# Patient Record
Sex: MALE | Race: WHITE
[De-identification: names, ages, dates, MRNs, and addresses within clinical notes are randomized per-mention and may not be internally consistent; named-entity substitution may affect disease eponyms.]

---

## 2019-10-15 ENCOUNTER — HOSPITAL ENCOUNTER (INPATIENT)
Dept: HOSPITAL 61 - ER | Age: 84
LOS: 8 days | Discharge: NURSING, REHAB OR LONG-TERM CARE | DRG: 291 | End: 2019-10-23
Attending: FAMILY MEDICINE | Admitting: FAMILY MEDICINE
Payer: MEDICARE

## 2019-10-15 VITALS — WEIGHT: 185 LBS | BODY MASS INDEX: 28.04 KG/M2 | HEIGHT: 68 IN

## 2019-10-15 VITALS — DIASTOLIC BLOOD PRESSURE: 53 MMHG | SYSTOLIC BLOOD PRESSURE: 105 MMHG

## 2019-10-15 VITALS — DIASTOLIC BLOOD PRESSURE: 56 MMHG | SYSTOLIC BLOOD PRESSURE: 121 MMHG

## 2019-10-15 VITALS — DIASTOLIC BLOOD PRESSURE: 57 MMHG | SYSTOLIC BLOOD PRESSURE: 114 MMHG

## 2019-10-15 VITALS — DIASTOLIC BLOOD PRESSURE: 55 MMHG | SYSTOLIC BLOOD PRESSURE: 151 MMHG

## 2019-10-15 DIAGNOSIS — H91.90: ICD-10-CM

## 2019-10-15 DIAGNOSIS — J44.1: ICD-10-CM

## 2019-10-15 DIAGNOSIS — I50.33: ICD-10-CM

## 2019-10-15 DIAGNOSIS — I73.9: ICD-10-CM

## 2019-10-15 DIAGNOSIS — R04.2: ICD-10-CM

## 2019-10-15 DIAGNOSIS — J44.0: ICD-10-CM

## 2019-10-15 DIAGNOSIS — I25.10: ICD-10-CM

## 2019-10-15 DIAGNOSIS — I08.0: ICD-10-CM

## 2019-10-15 DIAGNOSIS — M19.90: ICD-10-CM

## 2019-10-15 DIAGNOSIS — Z79.899: ICD-10-CM

## 2019-10-15 DIAGNOSIS — Z86.73: ICD-10-CM

## 2019-10-15 DIAGNOSIS — E87.5: ICD-10-CM

## 2019-10-15 DIAGNOSIS — I87.8: ICD-10-CM

## 2019-10-15 DIAGNOSIS — J96.01: ICD-10-CM

## 2019-10-15 DIAGNOSIS — I27.20: ICD-10-CM

## 2019-10-15 DIAGNOSIS — Z79.82: ICD-10-CM

## 2019-10-15 DIAGNOSIS — N18.3: ICD-10-CM

## 2019-10-15 DIAGNOSIS — Z95.2: ICD-10-CM

## 2019-10-15 DIAGNOSIS — E78.5: ICD-10-CM

## 2019-10-15 DIAGNOSIS — I13.0: Primary | ICD-10-CM

## 2019-10-15 DIAGNOSIS — J20.9: ICD-10-CM

## 2019-10-15 DIAGNOSIS — F03.90: ICD-10-CM

## 2019-10-15 DIAGNOSIS — Z85.828: ICD-10-CM

## 2019-10-15 DIAGNOSIS — N17.0: ICD-10-CM

## 2019-10-15 LAB
ALBUMIN SERPL-MCNC: 3.6 G/DL (ref 3.4–5)
ALBUMIN/GLOB SERPL: 1.1 {RATIO} (ref 1–1.7)
ALP SERPL-CCNC: 76 U/L (ref 46–116)
ALT SERPL-CCNC: 25 U/L (ref 16–63)
ANION GAP SERPL CALC-SCNC: 11 MMOL/L (ref 6–14)
APTT PPP: YELLOW S
AST SERPL-CCNC: 25 U/L (ref 15–37)
BACTERIA #/AREA URNS HPF: 0 /HPF
BASE EXCESS ABG: -2 MMOL/L (ref -3–3)
BASOPHILS # BLD AUTO: 0 X10^3/UL (ref 0–0.2)
BASOPHILS NFR BLD: 1 % (ref 0–3)
BILIRUB SERPL-MCNC: 2 MG/DL (ref 0.2–1)
BILIRUB UR QL STRIP: NEGATIVE
BUN SERPL-MCNC: 66 MG/DL (ref 8–26)
BUN/CREAT SERPL: 35 (ref 6–20)
CALCIUM SERPL-MCNC: 9.6 MG/DL (ref 8.5–10.1)
CHLORIDE SERPL-SCNC: 101 MMOL/L (ref 98–107)
CK SERPL-CCNC: 154 U/L (ref 39–308)
CO2 SERPL-SCNC: 30 MMOL/L (ref 21–32)
CREAT SERPL-MCNC: 1.9 MG/DL (ref 0.7–1.3)
EOSINOPHIL NFR BLD: 0.1 X10^3/UL (ref 0–0.7)
EOSINOPHIL NFR BLD: 2 % (ref 0–3)
ERYTHROCYTE [DISTWIDTH] IN BLOOD BY AUTOMATED COUNT: 15.4 % (ref 11.5–14.5)
FIBRINOGEN PPP-MCNC: CLEAR MG/DL
GFR SERPLBLD BASED ON 1.73 SQ M-ARVRAT: 33.8 ML/MIN
GLOBULIN SER-MCNC: 3.2 G/DL (ref 2.2–3.8)
GLUCOSE SERPL-MCNC: 96 MG/DL (ref 70–99)
HCO3 BLDA-SCNC: 23 MMOL/L (ref 21–28)
HCT VFR BLD CALC: 39.1 % (ref 39–53)
HGB BLD-MCNC: 12.9 G/DL (ref 13–17.5)
HYALINE CASTS #/AREA URNS LPF: (no result) /HPF
INSPIRATION SETTING TIME VENT: 28
LYMPHOCYTES # BLD: 0.8 X10^3/UL (ref 1–4.8)
LYMPHOCYTES NFR BLD AUTO: 10 % (ref 24–48)
MCH RBC QN AUTO: 29 PG (ref 25–35)
MCHC RBC AUTO-ENTMCNC: 33 G/DL (ref 31–37)
MCV RBC AUTO: 88 FL (ref 79–100)
MONO #: 0.9 X10^3/UL (ref 0–1.1)
MONOCYTES NFR BLD: 12 % (ref 0–9)
NEUT #: 6 X10^3/UL (ref 1.8–7.7)
NEUTROPHILS NFR BLD AUTO: 76 % (ref 31–73)
NITRITE UR QL STRIP: NEGATIVE
PCO2 BLDA: 39 MMHG (ref 35–46)
PH UR STRIP: 5 [PH]
PLATELET # BLD AUTO: 98 X10^3/UL (ref 140–400)
PO2 BLDA: 80 MMHG (ref 65–108)
POTASSIUM SERPL-SCNC: 4.2 MMOL/L (ref 3.5–5.1)
PROT SERPL-MCNC: 6.8 G/DL (ref 6.4–8.2)
PROT UR STRIP-MCNC: NEGATIVE MG/DL
RBC # BLD AUTO: 4.46 X10^6/UL (ref 4.3–5.7)
RBC #/AREA URNS HPF: 0 /HPF (ref 0–2)
SAO2 % BLDA: 94 % (ref 92–99)
SODIUM SERPL-SCNC: 142 MMOL/L (ref 136–145)
SQUAMOUS #/AREA URNS LPF: (no result) /LPF
UROBILINOGEN UR-MCNC: 0.2 MG/DL
WBC # BLD AUTO: 7.9 X10^3/UL (ref 4–11)
WBC #/AREA URNS HPF: (no result) /HPF (ref 0–4)

## 2019-10-15 PROCEDURE — 80053 COMPREHEN METABOLIC PANEL: CPT

## 2019-10-15 PROCEDURE — 80048 BASIC METABOLIC PNL TOTAL CA: CPT

## 2019-10-15 PROCEDURE — 71045 X-RAY EXAM CHEST 1 VIEW: CPT

## 2019-10-15 PROCEDURE — 93970 EXTREMITY STUDY: CPT

## 2019-10-15 PROCEDURE — 96374 THER/PROPH/DIAG INJ IV PUSH: CPT

## 2019-10-15 PROCEDURE — 85025 COMPLETE CBC W/AUTO DIFF WBC: CPT

## 2019-10-15 PROCEDURE — 71250 CT THORAX DX C-: CPT

## 2019-10-15 PROCEDURE — G0378 HOSPITAL OBSERVATION PER HR: HCPCS

## 2019-10-15 PROCEDURE — 93005 ELECTROCARDIOGRAM TRACING: CPT

## 2019-10-15 PROCEDURE — 36600 WITHDRAWAL OF ARTERIAL BLOOD: CPT

## 2019-10-15 PROCEDURE — 83880 ASSAY OF NATRIURETIC PEPTIDE: CPT

## 2019-10-15 PROCEDURE — 82550 ASSAY OF CK (CPK): CPT

## 2019-10-15 PROCEDURE — 93306 TTE W/DOPPLER COMPLETE: CPT

## 2019-10-15 PROCEDURE — 36415 COLL VENOUS BLD VENIPUNCTURE: CPT

## 2019-10-15 PROCEDURE — 83605 ASSAY OF LACTIC ACID: CPT

## 2019-10-15 PROCEDURE — 71046 X-RAY EXAM CHEST 2 VIEWS: CPT

## 2019-10-15 PROCEDURE — 84484 ASSAY OF TROPONIN QUANT: CPT

## 2019-10-15 PROCEDURE — 94760 N-INVAS EAR/PLS OXIMETRY 1: CPT

## 2019-10-15 PROCEDURE — 82805 BLOOD GASES W/O2 SATURATION: CPT

## 2019-10-15 PROCEDURE — 90471 IMMUNIZATION ADMIN: CPT

## 2019-10-15 PROCEDURE — 90686 IIV4 VACC NO PRSV 0.5 ML IM: CPT

## 2019-10-15 PROCEDURE — 81001 URINALYSIS AUTO W/SCOPE: CPT

## 2019-10-15 PROCEDURE — 83735 ASSAY OF MAGNESIUM: CPT

## 2019-10-15 PROCEDURE — 94640 AIRWAY INHALATION TREATMENT: CPT

## 2019-10-15 RX ADMIN — CARVEDILOL SCH MG: 3.12 TABLET, FILM COATED ORAL at 17:44

## 2019-10-15 RX ADMIN — IPRATROPIUM BROMIDE AND ALBUTEROL SULFATE SCH ML: .5; 3 SOLUTION RESPIRATORY (INHALATION) at 21:26

## 2019-10-15 RX ADMIN — SIMVASTATIN SCH MG: 20 TABLET, FILM COATED ORAL at 21:00

## 2019-10-15 NOTE — PHYS DOC
Past Medical History


Past Medical History:  COPD


Additional Past Medical Histor:  SKIN CA, POOR HISTORIAN


Past Surgical History:  No Surgical History


Additional Past Surgical Histo:  POOR HISTORIAN


Additional Information:  


QUIT 


Alcohol Use:  None


Drug Use:  None





Adult General


Chief Complaint


Chief Complaint:  RIB PAIN





[facility]


HPI





Patient is a 86  year old male who presents via EMS with complaining shortness 

of breath. Patient had a fall with left-sided chest wall injury about 10 days 

ago and complaining of increasing chest wall pain and shortness of breath 

because of the pain. Patient has history of COPD without having oxygen and also 

has history of CHF and currently taking Lasix. Patient denies chest pain, fever 

and chills, nausea and vomiting, focal neuro deficit.





Review of Systems


Review of Systems





Constitutional: Denies fever or chills []


Eyes: Denies change in visual acuity, redness, or eye pain []


HENT: Denies nasal congestion or sore throat []


Respiratory: Reports dry cough and shortness of breath


Cardiovascular: No additional information not addressed in HPI []


GI: Denies abdominal pain, nausea, vomiting, bloody stools or diarrhea []


: Denies dysuria or hematuria []


Musculoskeletal: Denies back pain or joint pain []


Integument: Denies rash or skin lesions []


Neurologic: Denies headache, focal weakness or sensory changes []


Endocrine: Denies polyuria or polydipsia []





All other systems were reviewed and found to be within normal limits, except as 

documented in this note.





Current Medications


Current Medications





Current Medications








 Medications


  (Trade)  Dose


 Ordered  Sig/Ray  Start Time


 Stop Time Status Last Admin


Dose Admin


 


 Albuterol/


 Ipratropium


  (Duoneb)  3 ml  1X  ONCE  10/15/19 07:30


 10/15/19 07:31 DC 10/15/19 07:08


3 ML


 


 Methylprednisolone


 Sodium Succinate


  (SOLU-Medrol


 125MG VIAL)  125 mg  1X  ONCE  10/15/19 07:30


 10/15/19 07:31 DC 10/15/19 07:37


125 MG











Allergies


Allergies





Allergies








Coded Allergies Type Severity Reaction Last Updated Verified


 


  No Known Drug Allergies    10/24/17 No











Physical Exam


Physical Exam








Constitutional: Well developed,  mild distress, non-toxic appearance. []


HENT: Normocephalic, atraumatic.


Eyes: PERRLA, EOMI, conjunctiva normal, no discharge. [] 


Neck: Normal range of motion, no tenderness, supple, no stridor. [] 


Cardiovascular:Heart rate regular rhythm, no murmur []


Lungs & Thorax: Mild respiratory distress with bilateral rales


Abdomen: Bowel sounds normal, soft, no tenderness, no masses, no pulsatile 

masses. [] 


Skin: Warm, dry, no erythema, no rash. [] 


Back: No tenderness, no CVA tenderness. [] 


Extremities: No tenderness, no cyanosis, no clubbing, ROM intact.


Neurologic: Alert and oriented X 3, no focal deficits noted. []


Psychologic: Affect normal, judgement normal, mood normal. []





Current Patient Data


Vital Signs





                                   Vital Signs








  Date Time  Temp Pulse Resp B/P (MAP) Pulse Ox O2 Delivery O2 Flow Rate FiO2


 


10/15/19 08:48  72 18 97/50 (66) 98 Nasal Cannula 2.0 


 


10/15/19 06:43 97.7       





 97.7       








Lab Values





                                Laboratory Tests








Test


 10/15/19


07:15 10/15/19


07:28


 


White Blood Count


 7.9 x10^3/uL


(4.0-11.0) 





 


Red Blood Count


 4.46 x10^6/uL


(4.30-5.70) 





 


Hemoglobin


 12.9 g/dL


(13.0-17.5)  L 





 


Hematocrit


 39.1 %


(39.0-53.0) 





 


Mean Corpuscular Volume


 88 fL ()


 





 


Mean Corpuscular Hemoglobin 29 pg (25-35)   


 


Mean Corpuscular Hemoglobin


Concent 33 g/dL


(31-37) 





 


Red Cell Distribution Width


 15.4 %


(11.5-14.5)  H 





 


Platelet Count


 98 x10^3/uL


(140-400)  L 





 


Neutrophils (%) (Auto) 76 % (31-73)  H 


 


Lymphocytes (%) (Auto) 10 % (24-48)  L 


 


Monocytes (%) (Auto) 12 % (0-9)  H 


 


Eosinophils (%) (Auto) 2 % (0-3)   


 


Basophils (%) (Auto) 1 % (0-3)   


 


Neutrophils # (Auto)


 6.0 x10^3/uL


(1.8-7.7) 





 


Lymphocytes # (Auto)


 0.8 x10^3/uL


(1.0-4.8)  L 





 


Monocytes # (Auto)


 0.9 x10^3/uL


(0.0-1.1) 





 


Eosinophils # (Auto)


 0.1 x10^3/uL


(0.0-0.7) 





 


Basophils # (Auto)


 0.0 x10^3/uL


(0.0-0.2) 





 


O2 Saturation 94 % (92-99)   


 


Arterial Blood pH


 7.39


(7.35-7.45) 





 


Arterial Blood pCO2 at


Patient Temp 39 mmHg


(35-46) 





 


Arterial Blood pO2 at Patient


Temp 80 mmHg


() 





 


Arterial Blood HCO3


 23 mmol/L


(21-28) 





 


Arterial Blood Base Excess


 -2 mmol/L


(-3-3) 





 


FiO2 28   


 


Sodium Level


 142 mmol/L


(136-145) 





 


Potassium Level


 4.2 mmol/L


(3.5-5.1) 





 


Chloride Level


 101 mmol/L


() 





 


Carbon Dioxide Level


 30 mmol/L


(21-32) 





 


Anion Gap 11 (6-14)   


 


Blood Urea Nitrogen


 66 mg/dL


(8-26)  H 





 


Creatinine


 1.9 mg/dL


(0.7-1.3)  H 





 


Estimated GFR


(Cockcroft-Gault) 33.8  


 





 


BUN/Creatinine Ratio 35 (6-20)  H 


 


Glucose Level


 96 mg/dL


(70-99) 





 


Calcium Level


 9.6 mg/dL


(8.5-10.1) 





 


Total Bilirubin


 2.0 mg/dL


(0.2-1.0)  H 





 


Aspartate Amino Transferase


(AST) 25 U/L (15-37)


 





 


Alanine Aminotransferase (ALT)


 25 U/L (16-63)


 





 


Alkaline Phosphatase


 76 U/L


() 





 


Creatine Kinase


 154 U/L


() 





 


Troponin I Quantitative


 < 0.017 ng/mL


(0.000-0.055) 





 


NT-Pro-B-Type Natriuretic


Peptide 3455 pg/mL


(0-449)  H 





 


Total Protein


 6.8 g/dL


(6.4-8.2) 





 


Albumin


 3.6 g/dL


(3.4-5.0) 





 


Albumin/Globulin Ratio 1.1 (1.0-1.7)   


 


Lactic Acid Level


 


 1.7 mmol/L


(0.4-2.0)





                                Laboratory Tests


10/15/19 07:15








                                Laboratory Tests


10/15/19 07:15














EKG


EKG


EKG interpreted by me. EKG at 0651 showed sinus rhythm with PACs, leftward axis,

 poor R-wave progress in anteroseptal leads, no acute ST and T-wave elevation.





Radiology/Procedures


Radiology/Procedures


[][facility]


                    [address] [place] [address]


                                 [phone]


                                        


                                 IMAGING REPORT





                                     Signed





PATIENT: COREY CARCAMO       ACCOUNT: [id number]     MRN#: [id number]


: 1933           LOCATION: ER              AGE: 86


SEX: M                    EXAM DT: 10/15/19         ACCESSION#: [id number]


STATUS: REG ER            ORD. PHYSICIAN: SANDRA CASTLE MD


REASON: shortness of breath


PROCEDURE: PORTABLE CHEST 1V





PORTABLE CHEST 1V 


 


INDICATION: Dyspnea. 


 


COMPARISON STUDY: None.


 


FINDINGS:


 


Lungs: Normal lung volume. Bilateral perihilar and basilar regions 


opacities. Indistinct pulmonary vasculature.


 


Pleura: Small to moderate right and trace left effusions.


 


Heart and Mediastinum: Cardiomegaly. Atherosclerotic thoracic aorta. 


Cardiac valve prosthesis.


 


IMPRESSION:  


 


Bilateral perihilar and basilar heterogeneous opacities, likely pulmonary 


edema. 


 


Small to moderate right and trace left pleural effusions.


 


Electronically signed by: Briana Pandey MD (10/15/2019 7:36 AM) 


[facility]














DICTATED and SIGNED BY:     BRIANA PANDEY MD


DATE:     10/15/19 0736





Course & Med Decision Making


Course & Med Decision Making


Pertinent Labs and Imaging studies reviewed. (See chart for details)





Evaluation of patient in ER showed 86-year-old male patient brought in by EMS 

because of shortness of breath and history of chest wall injury. Patient had 

elevation of BNP and hypoxia and treated with and Solu-Medrol. Patient did not 

have Lasix in ER because of elevation of renal function test and also blood 

pressure of 97 over 60 at time of admission.Patient requiring admission for 

further evaluation and treatment. Discussed with Dr. Venkat Mann who is in 

agreement with admission. Discussed findings and plan with patient and family, 

who acknowledge understanding and agreement.





Dragon Disclaimer


Dragon Disclaimer


This electronic medical record was generated, in whole or in part, using a voice

 recognition dictation system.





Departure


Departure


Impression:  


   Primary Impression:  


   CHF exacerbation


   Additional Impressions:  


   Dyspnea


   Hypoxia


   Renal insufficiency


   Elevated bilirubin


Disposition:   ADMITTED AS INPATIENT (at 0908)


Admitting Physician:  Venkat Mann (accepted admission at 09)


Condition:  IMPROVED


Referrals:  


BRIANA BERGERON MD (PCP)





Problem Qualifiers








   Primary Impression:  


   CHF exacerbation


   Heart failure type:  unspecified  Qualified Codes:  I50.9 - Heart failure, 

   unspecified


   Additional Impressions:  


   Dyspnea


   Dyspnea type:  unspecified  Qualified Codes:  R06.00 - Dyspnea, unspecified








SANDRA CASTLE MD             Oct 15, 2019 09:40

## 2019-10-15 NOTE — RAD
PORTABLE CHEST 1V 

 

INDICATION: Dyspnea. 

 

COMPARISON STUDY: None.

 

FINDINGS:

 

Lungs: Normal lung volume. Bilateral perihilar and basilar regions 

opacities. Indistinct pulmonary vasculature.

 

Pleura: Small to moderate right and trace left effusions.

 

Heart and Mediastinum: Cardiomegaly. Atherosclerotic thoracic aorta. 

Cardiac valve prosthesis.

 

IMPRESSION:  

 

Bilateral perihilar and basilar heterogeneous opacities, likely pulmonary 

edema. 

 

Small to moderate right and trace left pleural effusions.

 

Electronically signed by: Peter Pandey MD (10/15/2019 7:36 AM) 

[facility]

## 2019-10-15 NOTE — EKG
[facility]

              [address] [place] [address]

Test Date:    2019-10-15               Test Time:    06:51:25

Pat Name:     COREY CARCAMO               Department:   

Patient ID:   [id number]           Room:         2 1

Gender:       M                        Technician:   

:          1933               Requested By: SANDRA CASTLE

Order Number: [id number]           Reading MD:   Buck Smith MD

                                 Measurements

Intervals                              Axis          

Rate:         90                       P:            

IL:                                    QRS:          -15

QRSD:         98                       T:            49

QT:           374                                    

QTc:          462                                    

                           Interpretive Statements

Ectopic atrial rhythm

PAC's

PVC"s

Electronically Signed On 10- 14:18:15 CDT by Buck Smith MD

## 2019-10-16 VITALS — SYSTOLIC BLOOD PRESSURE: 109 MMHG | DIASTOLIC BLOOD PRESSURE: 58 MMHG

## 2019-10-16 VITALS — SYSTOLIC BLOOD PRESSURE: 133 MMHG | DIASTOLIC BLOOD PRESSURE: 63 MMHG

## 2019-10-16 VITALS — SYSTOLIC BLOOD PRESSURE: 97 MMHG | DIASTOLIC BLOOD PRESSURE: 43 MMHG

## 2019-10-16 VITALS — SYSTOLIC BLOOD PRESSURE: 113 MMHG | DIASTOLIC BLOOD PRESSURE: 38 MMHG

## 2019-10-16 VITALS — SYSTOLIC BLOOD PRESSURE: 94 MMHG | DIASTOLIC BLOOD PRESSURE: 46 MMHG

## 2019-10-16 VITALS — DIASTOLIC BLOOD PRESSURE: 61 MMHG | SYSTOLIC BLOOD PRESSURE: 116 MMHG

## 2019-10-16 RX ADMIN — TAMSULOSIN HYDROCHLORIDE SCH MG: 0.4 CAPSULE ORAL at 08:12

## 2019-10-16 RX ADMIN — IPRATROPIUM BROMIDE AND ALBUTEROL SULFATE SCH ML: .5; 3 SOLUTION RESPIRATORY (INHALATION) at 14:48

## 2019-10-16 RX ADMIN — POTASSIUM CHLORIDE SCH MEQ: 1500 TABLET, EXTENDED RELEASE ORAL at 08:13

## 2019-10-16 RX ADMIN — CARVEDILOL SCH MG: 3.12 TABLET, FILM COATED ORAL at 17:07

## 2019-10-16 RX ADMIN — ASPIRIN 325 MG ORAL TABLET SCH MG: 325 PILL ORAL at 08:12

## 2019-10-16 RX ADMIN — CARVEDILOL SCH MG: 3.12 TABLET, FILM COATED ORAL at 08:13

## 2019-10-16 RX ADMIN — IPRATROPIUM BROMIDE AND ALBUTEROL SULFATE SCH ML: .5; 3 SOLUTION RESPIRATORY (INHALATION) at 21:13

## 2019-10-16 RX ADMIN — IPRATROPIUM BROMIDE AND ALBUTEROL SULFATE SCH ML: .5; 3 SOLUTION RESPIRATORY (INHALATION) at 11:07

## 2019-10-16 RX ADMIN — MULTIPLE VITAMINS W/ MINERALS TAB SCH TAB: TAB at 08:13

## 2019-10-16 RX ADMIN — IPRATROPIUM BROMIDE AND ALBUTEROL SULFATE SCH ML: .5; 3 SOLUTION RESPIRATORY (INHALATION) at 07:13

## 2019-10-16 RX ADMIN — SIMVASTATIN SCH MG: 20 TABLET, FILM COATED ORAL at 22:27

## 2019-10-16 NOTE — HP
ADMIT DATE:  10/15/2019



CHIEF COMPLAINT AND HISTORY OF PRESENT ILLNESS:  This 86-year-old white male is

well known to me from followup in the office.  The patient presented by

ambulance to the ER on the day of admission complaining of shortness of breath. 

He has been having increasing shortness of breath, which he relates back to a

fall 10 days ago, getting progressively worse.  He has a history of COPD and

also a history of heart failure and is on Lasix.  He was felt to have an

exacerbation of CHF in the ER and admitted for the same.



PAST MEDICAL HISTORY:  Remarkable for COPD and congestive heart failure.  He has

some mild memory loss.  He has a prior history of CVA, hypertension, arthritis,

skin cancers.



MEDICATIONS:  Brought with the patient, listed on the computer and have been

addressed.



ALLERGIES:  He has no known drug allergies.



SOCIAL HISTORY:  He is a , nonsmoker, nondrinker, does not abuse drugs. 

Lives at home alone.



FAMILY HISTORY:  Noncontributory.



REVIEW OF SYSTEMS:  Remarkable for the shortness of breath.  He does have some

left-sided chest pain with a deeper breath.



PHYSICAL EXAMINATION:

GENERAL:  He is a well-developed, well-nourished white male in no acute distress

at the time of my examination, feels like he may be a little bit better since

admission the day prior.  Output has been greater than intake since admission.

HEAD, EYES, EARS, NOSE AND THROAT:  Unremarkable.

NECK:  Supple, without adenopathy or thyromegaly.

CHEST:  Reveals soft bibasilar rales.

HEART:  Regular rate and rhythm without S3, S4 or murmur heard.

ABDOMEN:  Soft, nontender, without hepatosplenomegaly or masses.

EXTREMITIES:  Without cyanosis, clubbing.  He does have 1+ edema.

NEUROLOGIC:  He is intact.



LABORATORY DATA:  Initial lab work shows CBC was remarkable for a platelet count

98,000.  Blood gas is unremarkable.  Chemistry panel shows a BUN of 66,

creatinine 1.9, total bilirubin of 2.  BNP elevated at 3455 and a troponin that

was normal.  Urinalysis is unremarkable.  Lactic acid was unremarkable.



Chest x-ray on admission showed a small to moderate right and trace left pleural

effusion and is consistent with pulmonary edema.



IMPRESSION:

1.  Exacerbation of congestive heart failure.

2.  Shortness of breath.

3.  Renal insufficiency.

4.  History of chronic obstructive pulmonary disease.



PLAN:  The patient has been admitted.  Diuresis will be ongoing.  Echocardiogram

will be checked and the patient will be monitored, managed and treated

appropriately.

 



______________________________

BRIANA BERGERON MD



DR:  SRIDEVI/sloan  JOB#:  [id number] / [id number]

DD:  10/16/2019 20:07  DT:  10/16/2019 20:24

## 2019-10-16 NOTE — NUR
wound care 

patient seen per wound care consult. see wound assessment.  patient has a lesion on the left 
forehead the area was cleaned and redressed with recommendations of Xeroform gauze with a 
Telfa dressing change every other day, patient also has a wound on the scalp the area was 
cleaned and redressed with recommendations of Xeroform gauze with a Telfa, change every 
other day.  patient has a stage 2 pressure ulcer on the buttock/ coccyx area, the area was 
cleaned and redressed with recommendations of Calazime cream, prn.  patient currently has a 
P500 bed.  wound care will continue to f/u for changes.

## 2019-10-16 NOTE — NUR
Pt given shower this am due to very dry skin and pt states he had something in his pocket 
that could not get wet. CNA noted money and cards in his pocket. Called security since pt 
had more than 400$ in his pocket. Pt verbalized understanding.

## 2019-10-17 VITALS — SYSTOLIC BLOOD PRESSURE: 101 MMHG | DIASTOLIC BLOOD PRESSURE: 41 MMHG

## 2019-10-17 VITALS — SYSTOLIC BLOOD PRESSURE: 95 MMHG | DIASTOLIC BLOOD PRESSURE: 54 MMHG

## 2019-10-17 VITALS — DIASTOLIC BLOOD PRESSURE: 54 MMHG | SYSTOLIC BLOOD PRESSURE: 94 MMHG

## 2019-10-17 VITALS — SYSTOLIC BLOOD PRESSURE: 121 MMHG | DIASTOLIC BLOOD PRESSURE: 55 MMHG

## 2019-10-17 VITALS — DIASTOLIC BLOOD PRESSURE: 62 MMHG | SYSTOLIC BLOOD PRESSURE: 131 MMHG

## 2019-10-17 LAB
ANION GAP SERPL CALC-SCNC: 6 MMOL/L (ref 6–14)
BUN SERPL-MCNC: 69 MG/DL (ref 8–26)
CALCIUM SERPL-MCNC: 9.4 MG/DL (ref 8.5–10.1)
CHLORIDE SERPL-SCNC: 101 MMOL/L (ref 98–107)
CO2 SERPL-SCNC: 33 MMOL/L (ref 21–32)
CREAT SERPL-MCNC: 1.7 MG/DL (ref 0.7–1.3)
GFR SERPLBLD BASED ON 1.73 SQ M-ARVRAT: 38.4 ML/MIN
GLUCOSE SERPL-MCNC: 93 MG/DL (ref 70–99)
POTASSIUM SERPL-SCNC: 4 MMOL/L (ref 3.5–5.1)
SODIUM SERPL-SCNC: 140 MMOL/L (ref 136–145)

## 2019-10-17 RX ADMIN — IPRATROPIUM BROMIDE AND ALBUTEROL SULFATE SCH ML: .5; 3 SOLUTION RESPIRATORY (INHALATION) at 16:26

## 2019-10-17 RX ADMIN — TAMSULOSIN HYDROCHLORIDE SCH MG: 0.4 CAPSULE ORAL at 08:31

## 2019-10-17 RX ADMIN — IPRATROPIUM BROMIDE AND ALBUTEROL SULFATE SCH ML: .5; 3 SOLUTION RESPIRATORY (INHALATION) at 11:14

## 2019-10-17 RX ADMIN — ASPIRIN 325 MG ORAL TABLET SCH MG: 325 PILL ORAL at 08:32

## 2019-10-17 RX ADMIN — IPRATROPIUM BROMIDE AND ALBUTEROL SULFATE SCH ML: .5; 3 SOLUTION RESPIRATORY (INHALATION) at 07:05

## 2019-10-17 RX ADMIN — POTASSIUM CHLORIDE SCH MEQ: 1500 TABLET, EXTENDED RELEASE ORAL at 08:31

## 2019-10-17 RX ADMIN — SIMVASTATIN SCH MG: 20 TABLET, FILM COATED ORAL at 21:00

## 2019-10-17 RX ADMIN — MULTIPLE VITAMINS W/ MINERALS TAB SCH TAB: TAB at 08:31

## 2019-10-17 RX ADMIN — FUROSEMIDE SCH MG: 10 INJECTION, SOLUTION INTRAMUSCULAR; INTRAVENOUS at 08:33

## 2019-10-17 RX ADMIN — CARVEDILOL SCH MG: 3.12 TABLET, FILM COATED ORAL at 08:00

## 2019-10-17 RX ADMIN — IPRATROPIUM BROMIDE AND ALBUTEROL SULFATE SCH ML: .5; 3 SOLUTION RESPIRATORY (INHALATION) at 20:39

## 2019-10-17 RX ADMIN — CARVEDILOL SCH MG: 3.12 TABLET, FILM COATED ORAL at 17:00

## 2019-10-17 NOTE — CARD
MR#: [id number]

Account#: [id number]

Accession#: [id number]

Date of Study: 10/17/2019

Ordering Physician: BRIANA BERGERON, 

Referring Physician: BRIANA BERGERON, 

Tech: Beatris Melissa





--------------- APPROVED REPORT --------------





EXAM: Two-dimensional and M-mode echocardiogram with Doppler and color Doppler.



Other Information 

Quality : FairHR: 87bpm



INDICATION

valvular disease



2D DIMENSIONS 

RVDd3.2 (2.9-3.5cm)Left Atrium(2D)3.7 (1.6-4.0cm)

IVSd1.2 (0.7-1.1cm)Aortic Root(2D)2.1 (2.0-3.7cm)

LVDd5.1 (3.9-5.9cm)LVOT Diameter1.9 (1.8-2.4cm)

PWd1.0 (0.7-1.1cm)LVDs2.7 (2.5-4.0cm)

FS (%) 48.3 %.5 ml

LVEF(%)79.3 (>50%)



Aortic Valve

AoV Peak Moo.326.7cm/sAoV VTI79.3cm

AO Peak GR.42.7mmHgLVOT Peak Moo.102.1cm/s

AO Mean GR.24mmHgAVA (VMAX)0.85cm2



Mitral Valve

MV E Apyabewo414.7cm/sMV DECEL VFLT290we

MV A Uxcvlgmb210.5cm/sE/A  Ratio1.7



Pulmonary Valve

PV Peak Vijgycdu62.1cm/s



Tricuspid Valve

TR P. Jdruveyr377lh/sTR Peak Gr.73mmHg



 LEFT VENTRICLE 

The left ventricle is normal size. There is mild to moderate concentric left ventricular hypertrophy.
 The Ejection Fraction is 60-65%. The left ventricular systolic function is normal and the ejection f
raction is within normal range. There is normal LV segmental wall motion. Transmitral Doppler flow pa
ttern is Grade III-reversible restrictive diastolic dysfunction.



 RIGHT VENTRICLE 

The right ventricle is borderline dilated. There is normal right ventricular wall thickness. The righ
t ventricular systolic function is normal.



 ATRIA 

The left atrium is borderline dilated. The right atrium is mildly dilated. The interatrial septum is 
intact with no evidence for an atrial septal defect or patent foramen ovale as noted on 2-D or Dopple
r imaging.



 AORTIC VALVE 

Transcatheter aortic valve replacement noted - maximum gradient of 43 mmHg and a mean gradient of 24 
mmHg. Poor image quality precludes accurate gradient evaluation.



 MITRAL VALVE 

The mitral valve is thickened but opens well. There is no evidence of mitral valve prolapse. There is
 no mitral valve stenosis. Doppler and Color-flow revealed moderate mitral regurgitation.



 TRICUSPID VALVE 

The tricuspid valve is normal in structure and function. Doppler and Color Flow revealed moderate tri
cuspid regurgitation with an estimated PAP of 59 mmHg. There is moderate to severe pulmonary hyperten
stephane. There is no tricuspid valve prolapse or vegetation. There is no tricuspid valve stenosis.



 PULMONIC VALVE 

The pulmonic valve is not well visualized. Doppler and Color Flow revealed trace to mild pulmonic vero
vular regurgitation. There is no pulmonic valvular stenosis.



 GREAT VESSELS 

The aortic root is normal in size. The IVC was not visualized.



 PERICARDIAL EFFUSION 

There is no evidence of significant pericardial effusion.



Critical Notification

Critical Value: No



<Conclusion>

The Ejection Fraction is 60-65%. The left ventricular systolic function is normal and the ejection fr
action is within normal range.

There is normal LV segmental wall motion.

Transmitral Doppler flow pattern is Grade III-reversible restrictive diastolic dysfunction.

Transcatheter aortic valve replacement noted - maximum gradient of 43 mmHg and a mean gradient of 24 
mmHg. Poor image quality precludes accurate gradient evaluation.

Doppler and Color-flow revealed moderate mitral regurgitation.

Doppler and Color Flow revealed moderate tricuspid regurgitation with an estimated PAP of 59 mmHg. Th
ere is moderate to severe pulmonary hypertension.



Signed by : Buck Smith, 

Electronically Approved : 10/17/2019 13:19:14

## 2019-10-17 NOTE — PDOC
GENERAL


General:


vss and afebrile. awake and alert and thinks breathing little better but still 

not at baseline. chest with soft bibasilar crackles, heart regular, abdomen 

benign. will increased diuresis and follow lytes  echo today.





VITAL SIGNS/I&O


Vital Signs/I&O:





                                   Vital Signs








  Date Time  Temp Pulse Resp B/P (MAP) Pulse Ox O2 Delivery O2 Flow Rate FiO2


 


10/17/19 07:05     95 Nasal Cannula 2.0 


 


10/17/19 07:00 96.4 73 18 101/41 (61)    





 96.4       














                                    I & O   


 


 10/16/19 10/16/19 10/17/19





 14:59 22:59 06:59


 


Intake Total 480 ml 100 ml 300 ml


 


Balance 480 ml 100 ml 300 ml











ALLERGIES


Allergies:





Allergies








Coded Allergies Type Severity Reaction Last Updated Verified


 


  No Known Drug Allergies    10/24/17 No











MEDS


Medications:





Current Medications








 Medications


  (Trade)  Dose


 Ordered  Sig/Ray


 Route


 PRN Reason  Start Time


 Stop Time Status Last Admin


Dose Admin


 


 Aspirin


  (Jefry Aspirin)  325 mg  DAILY


 PO


   10/16/19 09:00


    10/16/19 08:12





 


 Furosemide


  (Lasix)  20 mg  DAILY


 PO


   10/16/19 09:00


 10/16/19 20:09 DC 10/16/19 08:12





 


 Tamsulosin HCl


  (Flomax)  0.4 mg  DAILY


 PO


   10/16/19 09:00


    10/16/19 08:12





 


 Multivitamins


  (Thera M Plus)  1 tab  DAILY


 PO


   10/16/19 09:00


    10/16/19 08:13





 


 Influenza Virus


 Vaccine Quadrival


  (Afluria Quad


 2019-20 (3yr Up)


 Syringe)  0.5 ml  ONCE ONCE


 VAX IM


   10/16/19 11:00


 10/16/19 11:01 DC 10/16/19 17:10














LAB


Lab:





                                Laboratory Tests








Test


 10/17/19


05:04


 


Sodium Level


 140 mmol/L


(136-145)


 


Potassium Level


 4.0 mmol/L


(3.5-5.1)


 


Chloride Level


 101 mmol/L


()


 


Carbon Dioxide Level


 33 mmol/L


(21-32)  H


 


Anion Gap 6 (6-14)  


 


Blood Urea Nitrogen


 69 mg/dL


(8-26)  H


 


Creatinine


 1.7 mg/dL


(0.7-1.3)  H


 


Estimated GFR


(Cockcroft-Gault) 38.4  





 


Glucose Level


 93 mg/dL


(70-99)


 


Calcium Level


 9.4 mg/dL


(8.5-10.1)





                                Laboratory Tests


10/17/19 05:04

















BRIANA BERGERON MD              Oct 17, 2019 08:28

## 2019-10-18 VITALS — DIASTOLIC BLOOD PRESSURE: 59 MMHG | SYSTOLIC BLOOD PRESSURE: 111 MMHG

## 2019-10-18 VITALS — DIASTOLIC BLOOD PRESSURE: 49 MMHG | SYSTOLIC BLOOD PRESSURE: 105 MMHG

## 2019-10-18 VITALS — DIASTOLIC BLOOD PRESSURE: 43 MMHG | SYSTOLIC BLOOD PRESSURE: 106 MMHG

## 2019-10-18 VITALS — SYSTOLIC BLOOD PRESSURE: 107 MMHG | DIASTOLIC BLOOD PRESSURE: 56 MMHG

## 2019-10-18 VITALS — DIASTOLIC BLOOD PRESSURE: 33 MMHG | SYSTOLIC BLOOD PRESSURE: 113 MMHG

## 2019-10-18 RX ADMIN — CARVEDILOL SCH MG: 3.12 TABLET, FILM COATED ORAL at 08:00

## 2019-10-18 RX ADMIN — IPRATROPIUM BROMIDE AND ALBUTEROL SULFATE SCH ML: .5; 3 SOLUTION RESPIRATORY (INHALATION) at 20:07

## 2019-10-18 RX ADMIN — IPRATROPIUM BROMIDE AND ALBUTEROL SULFATE SCH ML: .5; 3 SOLUTION RESPIRATORY (INHALATION) at 07:49

## 2019-10-18 RX ADMIN — SIMVASTATIN SCH MG: 20 TABLET, FILM COATED ORAL at 20:13

## 2019-10-18 RX ADMIN — POTASSIUM CHLORIDE SCH MEQ: 1500 TABLET, EXTENDED RELEASE ORAL at 08:59

## 2019-10-18 RX ADMIN — MULTIPLE VITAMINS W/ MINERALS TAB SCH TAB: TAB at 08:59

## 2019-10-18 RX ADMIN — FUROSEMIDE SCH MG: 10 INJECTION, SOLUTION INTRAMUSCULAR; INTRAVENOUS at 09:00

## 2019-10-18 RX ADMIN — ASPIRIN 325 MG ORAL TABLET SCH MG: 325 PILL ORAL at 08:59

## 2019-10-18 RX ADMIN — IPRATROPIUM BROMIDE AND ALBUTEROL SULFATE SCH ML: .5; 3 SOLUTION RESPIRATORY (INHALATION) at 11:33

## 2019-10-18 RX ADMIN — CARVEDILOL SCH MG: 3.12 TABLET, FILM COATED ORAL at 16:23

## 2019-10-18 RX ADMIN — TAMSULOSIN HYDROCHLORIDE SCH MG: 0.4 CAPSULE ORAL at 08:59

## 2019-10-18 RX ADMIN — IPRATROPIUM BROMIDE AND ALBUTEROL SULFATE SCH ML: .5; 3 SOLUTION RESPIRATORY (INHALATION) at 16:01

## 2019-10-18 RX ADMIN — OXYCODONE HYDROCHLORIDE AND ACETAMINOPHEN SCH MG: 500 TABLET ORAL at 08:59

## 2019-10-18 NOTE — NUR
SW following pt. Pt agreeable with a screen at [facility]. LORI phoned and faxed 
referral. Pt is accepted but no beds right now. A bed might open possible later today or 
tomorrow. Will continue to follow.

## 2019-10-18 NOTE — RAD
CHEST PA   LATERAL

 

Clinical indications: Cough to blood clot

 

COMPARISON: October 15, 2019. 

 

Findings: Prominent calcified granuloma is seen anteriorly in the lateral 

view within the retrosternal space. This appears be located within the 

right upper lobe. Bilateral lung infiltrates or pulmonary edema are again 

evident. This has become more consolidative on the left side. There has 

been improvement of the infiltrate on the right side especially within 

right upper lobe. Moderate size right-sided pleural effusion is unchanged.

Small posterior left-sided pleural effusion is evident. No pneumothorax is

seen. Heart size is prominent but unchanged. Mediastinum is unchanged. 

 

Impression: Radiographic findings may represent CHF. There has been 

increase in consolidative alveolar pulmonary edema of the left lung field 

but improvement of pulmonary edema of the right lung field.

 

Stable moderate size right-sided pleural effusion. Small stable left-sided

pleural effusion.

 

Electronically signed by: Jordon Garcia MD (10/18/2019 3:59 PM) 

[facility]

## 2019-10-18 NOTE — NUR
SW following pt. Spoke with Physician and pt is not ready to dc today as pt is coughing up 
blood. Discussed with RN. Updated Nida at PP.

## 2019-10-18 NOTE — PDOC
GENERAL


General:


vss and afebrile. awake and alert and breathing better. I&O not recorded. chest 

better breath sounds, heart regular, abdomen benign. will continue diuresis and 

follow labs.





VITAL SIGNS/I&O


Vital Signs/I&O:





                                   Vital Signs








  Date Time  Temp Pulse Resp B/P (MAP) Pulse Ox O2 Delivery O2 Flow Rate FiO2


 


10/18/19 03:13 98.2 77 18 111/59 (76) 93 Nasal Cannula 2.0 





 98.2       














                                    I & O   


 


 10/17/19 10/17/19 10/18/19





 14:59 22:59 06:59


 


Intake Total 850 ml 640 ml 240 ml


 


Balance 850 ml 640 ml 240 ml











ALLERGIES


Allergies:





Allergies








Coded Allergies Type Severity Reaction Last Updated Verified


 


  No Known Drug Allergies    10/24/17 BRIANA Crook MD              Oct 18, 2019 07:53

## 2019-10-19 VITALS — SYSTOLIC BLOOD PRESSURE: 117 MMHG | DIASTOLIC BLOOD PRESSURE: 38 MMHG

## 2019-10-19 VITALS — DIASTOLIC BLOOD PRESSURE: 38 MMHG | SYSTOLIC BLOOD PRESSURE: 112 MMHG

## 2019-10-19 VITALS — SYSTOLIC BLOOD PRESSURE: 104 MMHG | DIASTOLIC BLOOD PRESSURE: 40 MMHG

## 2019-10-19 VITALS — SYSTOLIC BLOOD PRESSURE: 103 MMHG | DIASTOLIC BLOOD PRESSURE: 41 MMHG

## 2019-10-19 VITALS — SYSTOLIC BLOOD PRESSURE: 119 MMHG | DIASTOLIC BLOOD PRESSURE: 69 MMHG

## 2019-10-19 VITALS — SYSTOLIC BLOOD PRESSURE: 104 MMHG | DIASTOLIC BLOOD PRESSURE: 44 MMHG

## 2019-10-19 VITALS — DIASTOLIC BLOOD PRESSURE: 35 MMHG | SYSTOLIC BLOOD PRESSURE: 111 MMHG

## 2019-10-19 LAB
ANION GAP SERPL CALC-SCNC: 4 MMOL/L (ref 6–14)
BUN SERPL-MCNC: 45 MG/DL (ref 8–26)
CALCIUM SERPL-MCNC: 9.4 MG/DL (ref 8.5–10.1)
CHLORIDE SERPL-SCNC: 104 MMOL/L (ref 98–107)
CO2 SERPL-SCNC: 33 MMOL/L (ref 21–32)
CREAT SERPL-MCNC: 1.6 MG/DL (ref 0.7–1.3)
GFR SERPLBLD BASED ON 1.73 SQ M-ARVRAT: 41.2 ML/MIN
GLUCOSE SERPL-MCNC: 113 MG/DL (ref 70–99)
POTASSIUM SERPL-SCNC: 5.1 MMOL/L (ref 3.5–5.1)
SODIUM SERPL-SCNC: 141 MMOL/L (ref 136–145)

## 2019-10-19 RX ADMIN — IPRATROPIUM BROMIDE AND ALBUTEROL SULFATE SCH ML: .5; 3 SOLUTION RESPIRATORY (INHALATION) at 11:54

## 2019-10-19 RX ADMIN — OXYCODONE HYDROCHLORIDE AND ACETAMINOPHEN SCH MG: 500 TABLET ORAL at 09:06

## 2019-10-19 RX ADMIN — SIMVASTATIN SCH MG: 20 TABLET, FILM COATED ORAL at 20:01

## 2019-10-19 RX ADMIN — CARVEDILOL SCH MG: 3.12 TABLET, FILM COATED ORAL at 17:00

## 2019-10-19 RX ADMIN — FUROSEMIDE SCH MG: 10 INJECTION, SOLUTION INTRAMUSCULAR; INTRAVENOUS at 09:08

## 2019-10-19 RX ADMIN — TAMSULOSIN HYDROCHLORIDE SCH MG: 0.4 CAPSULE ORAL at 09:06

## 2019-10-19 RX ADMIN — POTASSIUM CHLORIDE SCH MEQ: 1500 TABLET, EXTENDED RELEASE ORAL at 09:06

## 2019-10-19 RX ADMIN — CEFTRIAXONE SCH GM: 1 INJECTION, POWDER, FOR SOLUTION INTRAMUSCULAR; INTRAVENOUS at 12:01

## 2019-10-19 RX ADMIN — IPRATROPIUM BROMIDE AND ALBUTEROL SULFATE SCH ML: .5; 3 SOLUTION RESPIRATORY (INHALATION) at 16:57

## 2019-10-19 RX ADMIN — METHYLPREDNISOLONE SODIUM SUCCINATE SCH MG: 40 INJECTION, POWDER, FOR SOLUTION INTRAMUSCULAR; INTRAVENOUS at 20:01

## 2019-10-19 RX ADMIN — IPRATROPIUM BROMIDE AND ALBUTEROL SULFATE SCH ML: .5; 3 SOLUTION RESPIRATORY (INHALATION) at 20:53

## 2019-10-19 RX ADMIN — IPRATROPIUM BROMIDE AND ALBUTEROL SULFATE SCH ML: .5; 3 SOLUTION RESPIRATORY (INHALATION) at 09:00

## 2019-10-19 RX ADMIN — CARVEDILOL SCH MG: 3.12 TABLET, FILM COATED ORAL at 09:06

## 2019-10-19 RX ADMIN — ASPIRIN 325 MG ORAL TABLET SCH MG: 325 PILL ORAL at 09:05

## 2019-10-19 RX ADMIN — METHYLPREDNISOLONE SODIUM SUCCINATE SCH MG: 40 INJECTION, POWDER, FOR SOLUTION INTRAMUSCULAR; INTRAVENOUS at 12:01

## 2019-10-19 RX ADMIN — MULTIPLE VITAMINS W/ MINERALS TAB SCH TAB: TAB at 09:06

## 2019-10-19 RX ADMIN — POLYETHYLENE GLYCOL 3350 PRN GM: 17 POWDER, FOR SOLUTION ORAL at 12:07

## 2019-10-19 NOTE — PN
DATE:  10/19/2019



LOCATION:  Room 652.



SUBJECTIVE:  The patient is awake, alert, sitting in chair, getting ready to

walk with physical therapy.  States he feels overall the shortness of breath may

be slightly improved.  During the day yesterday on at least a couple of

occasions, he coughed up some blood.



OBJECTIVE:

VITAL SIGNS:  Stable.  He is afebrile.

GENERAL:  Again, he is awake and alert.

CHEST:  Reveals better breath sounds with less rales.

HEART:  Regular.

ABDOMEN:  Benign.

EXTREMITIES:  Trace edema.

NEUROLOGIC:  He is intact.



LABORATORY DATA:  Chest x-ray yesterday shows increasing consolidative alveolar

pulmonary edema on the left lung field with improvement on the right.



IMPRESSION:  Congestive heart failure, hypoxia, hemoptysis.



PLAN:  We will continue diuresis at this point in time and I am going to ask

Cardiology and Pulmonary for opinion that he does not seem to be improving as I

would have expected with just a ____ variety of pulmonary edema.

 



______________________________

BRIANA BERGERON MD



DR:  SRIDEVI/sloan  JOB#:  [id number] / [id number]

DD:  10/19/2019 10:04  DT:  10/19/2019 11:00

## 2019-10-19 NOTE — PDOC
Provider Note


Provider Note


[id number]





acute resp fail


acute diastolic chf


acute bronchitis


ae of copd





see orders











AUDELIA MARIA MD               Oct 19, 2019 10:08

## 2019-10-19 NOTE — CONS
DATE OF CONSULTATION:  10/19/2019



I was asked to see this 86-year-old gentleman for hemoptysis.



HISTORY OF PRESENT ILLNESS:  The patient is very hard of hearing and poor

historian.  Most of the information was obtained from nursing staff and

patient's chart.  The patient was brought to the Emergency Room for increased

shortness of breath.  He had a fall with right-sided chest wall injury about 10

days ago.  He does have cough.  Yesterday, he coughed up small amount of blood

clot.  He has not had any since yesterday.  He has had one episode of epistaxis

today.



PAST MEDICAL HISTORY:  COPD, CHF, history of CVA, skin cancer, and arthritis.



MEDICATIONS:  Currently, he is on Lasix 40 mg IV daily, vitamin C, multivitamin,

Flomax, aspirin, KCl, Zocor, Naprosyn, DuoNeb, and Coreg.



SOCIAL HISTORY:  Positive for smoking, details are not known.



FAMILY HISTORY:  Unable to obtain.  The patient is a very poor historian.



REVIEW OF SYSTEMS:  As mentioned as above.  I have discussed the patient with

RN, other systems otherwise negative.



PHYSICAL EXAMINATION:

GENERAL:  He is an elderly gentleman.

VITAL SIGNS:  His O2 saturation on 2 liters of oxygen is 93%, respiratory rate

18, heart rate 63, blood pressure 102/41, temperature 97.7.

HEENT:  Normocephalic, atraumatic.  Pupils equal, round, and reactive to light. 

Throat is clear.  Nose is clear.

NECK:  There is no JVD, lymphadenopathy or thyromegaly.

CARDIOVASCULAR:  Regular rate and rhythm.  PMI is nondisplaced.  Chest expansion

is normal.

LUNGS:  There is an end-expiratory wheezing, bibasilar crackles, dullness at the

bases.

ABDOMEN:  Soft.  Bowel sounds are good.  There is no mass.

EXTREMITIES:  There is edema with chronic skin changes.

NEUROLOGIC:  He is alert.

SKIN:  Chronic changes.

LYMPHATICS:  There is no lymphadenopathy.



LABORATORY DATA:  I reviewed the following lab data:  Chest x-ray shows

bilateral small effusion, right more than the left, with bilateral infiltrate

and increased vascular marking.



ABG on 10/15/2019, pH 7.39, pCO2 of 39, pO2 of 80 on 28% FiO2.  Sodium 141,

potassium 5.1, chloride 104, CO2 of 33, glucose 113, BUN 45, and creatinine 1.6.

 Creatinine on 10/15/2019 was 1.9.



Echocardiogram did show ejection fraction 60-65%, diastolic dysfunction, and

estimated pulmonary artery pressure of 59.  Moderate mitral regurgitation.



IMPRESSION:

1.  Acute respiratory failure secondary to acute diastolic congestive heart

failure and acute exacerbation of chronic obstructive pulmonary disease and

acute bronchitis versus others.

2.  Abnormal chest x-ray.

3.  Acute diastolic congestive heart failure.

4.  Acute exacerbation of chronic obstructive pulmonary disease.

5.  Acute bronchitis.

6.  Acute kidney injury.

7.  Hyperkalemia.

8.  Pulmonary hypertension, secondary, due to chronic obstructive pulmonary

disease, congestive heart failure, moderate mitral regurgitation.



PLAN AND RECOMMENDATION:

1.  Titrate FiO2 to keep O2 saturation 92%.

2.  Continue bronchodilator.

3.  Continue Lasix.  Monitor potassium and KCl.  We will discontinue potassium

supplement since his potassium is 5.1 today.

4.  Add Solu-Medrol 40 mg IV b.i.d.

5.  Lower extremity venous Doppler.

6.  Monitor respiratory status very closely.

7.  Add Rocephin.

8.  Code status needs to be addressed.  Defer to primary doctor.

9.  The patient had one episode of coughing up small amount of blood clot 
yesterday.  It

could be secondary to acute bronchitis.  He has not had any more hemoptysis.  He
had

epistaxis, which I suspect is secondary to dryness.  I will add humidity to

oxygen.



Thank you very much for allowing me to participate in the care of this very nice

gentleman.  The findings and recommendations were discussed with RN.

 



______________________________

AUDELIA MARIA M.D.



:  AISHA/sloan  JOB#:  [id number] / [id number]

DD:  10/19/2019 10:07  DT:  10/19/2019 11:14

HAROON

## 2019-10-19 NOTE — CONS
DATE OF CONSULTATION:  10/19/2019



REASON FOR CONSULTATION:  Heart failure and fall.



HISTORY OF PRESENT ILLNESS:  The patient is a pleasant 86-year-old man with past

medical history as noted below, who was actually admitted on 10/16/2019 for

worsening shortness of breath and he was treated with Lasix.



Due to persistent dyspnea despite treatment with Lasix, the Cardiology and

Pulmonology services were consulted.



He was actually admitted to the hospital previously and at that time, the

overall impression was for conservative management given his age and dementia.



Currently, the patient is very hard of hearing and his history is quite limited.

 Most of the history is obtained from chart review.  Currently being treated for

bronchitis and diastolic heart failure exacerbation.



PAST MEDICAL HISTORY:

1.  Coronary artery disease with unknown anatomy and interventions.

2.  Aortic valve stenosis, status post transcatheter aortic valve replacement in

2017.

3.  Chronic kidney disease.

4.  Prior history of posterior circulation stroke.

5.  Thrombocytopenia.

6.  Peripheral arterial disease

7.  Abdominal aortic aneurysm, status post endovascular aortic repair.

8.  Chronic obstructive pulmonary disease.

9.  Hypertension.

10.  Dyslipidemia.



He was actually seen in the office at [facility] on 04/2019 and at that

time was felt to be in stable condition and previous to that was also admitted

for syncope without any clear cardiovascular reason identified.  An

echocardiogram done at that visit in April did not reveal any significant

pathology with his transcatheter aortic valve replacement.  At that time, he was

also being treated with Lasix as needed at 20 mg dose.



CURRENT CARDIOVASCULAR MEDICATIONS:  As follows:

1.  Lasix 40 mg IV push.

2.  Aspirin 325 mg daily.

3.  Simvastatin 20 mg daily.

4.  Carvedilol 3.125 mg p.o. b.i.d.



ALLERGIES:  No known drug allergies.



SOCIAL HISTORY:  No alcohol, tobacco or illicit drug use.  He is .



REVIEW OF SYSTEMS:  Unable to be obtained at this time.



PHYSICAL EXAMINATION:

VITAL SIGNS:  Afebrile, 59, 18, 104/44, 94% on 2 liters.

GENERAL:  He is alert and oriented only to self.

HEAD AND NECK:  He has multiple abrasions covered in bandages.

CARDIAC:  Regular heart tones with a 3/6 systolic murmur, most consistent with

aortic valve residual stenosis across the transcatheter valve.

LUNGS:  Notable for decreased breath sounds at the bases, but otherwise

unremarkable.

ABDOMEN:  Soft.

EXTREMITIES:  Demonstrate bilateral lower extremity venous stasis changes with

1+ pitting edema.

NEUROLOGIC:  No focal deficits.



DIAGNOSTIC STUDIES:  EKG demonstrates ectopic atrial rhythm with PACs and PVCs.



Echocardiogram performed on 10/17/2019 reveals a normal ejection fraction with

mild-to-moderate residual aortic stenosis with moderate mitral regurgitation and

moderate-to-severe pulmonary hypertension, which are similar in finding compared

to an echocardiogram at [facility] 6 months ago.



Chest x-ray is suggestive of moderate fluid overload.



IMPRESSION:

1.  Acute on chronic diastolic heart failure.

2.  Aortic valve stenosis, status post transcatheter aortic valve replacement,

currently stable.

3.  Coronary artery disease, currently without any angina.



RECOMMENDATIONS:  Agree with aggressive pulmonary toilet and continue diuresis

with Lasix.  Nothing further from a cardiovascular standpoint at this time. 

Supportive care.  If for some reason there is concern regarding further diuresis

we could always consider a right heart catheterization in the next 24-48 hours

as needed.



Thank you for this consultation.

 



______________________________

OLU MARTINEZ MD



DR:  MARIA ALEJANDRA/sloan  JOB#:  [id number] / [id number]

DD:  10/19/2019 14:53  DT:  10/19/2019 15:15

## 2019-10-20 VITALS — SYSTOLIC BLOOD PRESSURE: 113 MMHG | DIASTOLIC BLOOD PRESSURE: 49 MMHG

## 2019-10-20 VITALS — DIASTOLIC BLOOD PRESSURE: 47 MMHG | SYSTOLIC BLOOD PRESSURE: 113 MMHG

## 2019-10-20 VITALS — SYSTOLIC BLOOD PRESSURE: 110 MMHG | DIASTOLIC BLOOD PRESSURE: 49 MMHG

## 2019-10-20 VITALS — SYSTOLIC BLOOD PRESSURE: 120 MMHG | DIASTOLIC BLOOD PRESSURE: 50 MMHG

## 2019-10-20 VITALS — SYSTOLIC BLOOD PRESSURE: 108 MMHG | DIASTOLIC BLOOD PRESSURE: 48 MMHG

## 2019-10-20 VITALS — SYSTOLIC BLOOD PRESSURE: 114 MMHG | DIASTOLIC BLOOD PRESSURE: 40 MMHG

## 2019-10-20 LAB
ANION GAP SERPL CALC-SCNC: 5 MMOL/L (ref 6–14)
BUN SERPL-MCNC: 36 MG/DL (ref 8–26)
CALCIUM SERPL-MCNC: 9.1 MG/DL (ref 8.5–10.1)
CHLORIDE SERPL-SCNC: 103 MMOL/L (ref 98–107)
CO2 SERPL-SCNC: 30 MMOL/L (ref 21–32)
CREAT SERPL-MCNC: 1.4 MG/DL (ref 0.7–1.3)
GFR SERPLBLD BASED ON 1.73 SQ M-ARVRAT: 48.1 ML/MIN
GLUCOSE SERPL-MCNC: 149 MG/DL (ref 70–99)
POTASSIUM SERPL-SCNC: 5.2 MMOL/L (ref 3.5–5.1)
SODIUM SERPL-SCNC: 138 MMOL/L (ref 136–145)

## 2019-10-20 RX ADMIN — FUROSEMIDE SCH MG: 10 INJECTION, SOLUTION INTRAMUSCULAR; INTRAVENOUS at 08:59

## 2019-10-20 RX ADMIN — CARVEDILOL SCH MG: 3.12 TABLET, FILM COATED ORAL at 08:57

## 2019-10-20 RX ADMIN — SIMVASTATIN SCH MG: 20 TABLET, FILM COATED ORAL at 20:55

## 2019-10-20 RX ADMIN — MULTIPLE VITAMINS W/ MINERALS TAB SCH TAB: TAB at 08:57

## 2019-10-20 RX ADMIN — IPRATROPIUM BROMIDE AND ALBUTEROL SULFATE SCH ML: .5; 3 SOLUTION RESPIRATORY (INHALATION) at 20:37

## 2019-10-20 RX ADMIN — IPRATROPIUM BROMIDE AND ALBUTEROL SULFATE SCH ML: .5; 3 SOLUTION RESPIRATORY (INHALATION) at 07:20

## 2019-10-20 RX ADMIN — OXYCODONE HYDROCHLORIDE AND ACETAMINOPHEN SCH MG: 500 TABLET ORAL at 08:57

## 2019-10-20 RX ADMIN — ASPIRIN 325 MG ORAL TABLET SCH MG: 325 PILL ORAL at 08:57

## 2019-10-20 RX ADMIN — Medication SCH CAP: at 20:55

## 2019-10-20 RX ADMIN — IPRATROPIUM BROMIDE AND ALBUTEROL SULFATE SCH ML: .5; 3 SOLUTION RESPIRATORY (INHALATION) at 16:00

## 2019-10-20 RX ADMIN — CEFTRIAXONE SCH GM: 1 INJECTION, POWDER, FOR SOLUTION INTRAMUSCULAR; INTRAVENOUS at 14:14

## 2019-10-20 RX ADMIN — IPRATROPIUM BROMIDE AND ALBUTEROL SULFATE SCH ML: .5; 3 SOLUTION RESPIRATORY (INHALATION) at 11:34

## 2019-10-20 RX ADMIN — TAMSULOSIN HYDROCHLORIDE SCH MG: 0.4 CAPSULE ORAL at 08:57

## 2019-10-20 RX ADMIN — CARVEDILOL SCH MG: 3.12 TABLET, FILM COATED ORAL at 17:00

## 2019-10-20 NOTE — RAD
PQRS Compliance Statement:

 

One or more of the following individualized dose reduction techniques were

utilized for this examination:  

1. Automated exposure control  

2. Adjustment of the mA and/or kV according to patient size  

3. Use of iterative reconstruction technique

 

CT CHEST WO CONTRAST 10/20/2019 12:00 AM

 

Indication: Abnormal chest x-ray

 

COMPARISON: Chest radiograph 10/18/2019

 

TECHNIQUE: Multiple axial CT images of the chest were obtained without 

intravenous contrast. Coronal and sagittal reformats are provided.

 

FINDINGS:

 

Perihilar alveolar airspace disease may represent multifocal pulmonary 

infiltrates, alveolar edema or alveolar hemorrhage. Small right and trace 

left pleural effusions adjacent compressive atelectasis versus 

infiltrates. No pneumothorax. No pathologically enlarged mediastinal or 

bilateral hilar lymph nodes within the limitations of noncontrast 

examination. Heart size is enlarged. Ascending thoracic aortic vascular 

graft is identified. Evaluation is limited by lack of intravenous 

contrast. Dense coronary artery vascular calcifications are present. 

Cardiomegaly. No pericardial effusion. Thoracic aorta is otherwise normal 

in caliber. Thoracic esophagus is normal. Visualized portions of the upper

abdomen limited by lack of intravenous contrast. Mild atrophy of the 

pancreas. Gallbladder is present. No suspicious osseous normality is 

identified.

 

IMPRESSION:

 

Bilateral perihilar alveolar airspace disease most favors pulmonary edema 

and/or hemorrhage. Pulmonary infiltrates may have similar appearance.

 

Cardiomegaly with small right and trace left pleural effusion and adjacent

compressive atelectasis versus infiltrates.

 

Electronically signed by: Ting Barrett MD (10/20/2019 9:04 PM) 

[facility] [place]

## 2019-10-20 NOTE — RAD
Ultrasound venous Doppler

 

INDICATION:Bilateral leg edema and redness.

 

TECHNIQUE: Grayscale, color Doppler and spectral waveform ultrasound 

images of the bilateral lower extremities deep veins obtained.

 

COMPARISON: None

 

FINDINGS:

Limited exam as patient refused to lay in the back. Patient was scanned in

appropriate position. Within these limitations, the interrogated deep 

veins are compressible and demonstrate evidence of blood flow with normal 

respiratory variation and response to augmentation.

 

Bilateral significant leg soft tissue edema causing limited visualization 

of posterior tibial vein and peroneal veins..

 

IMPRESSION:

No sonographic evidence of acute DVT of the bilateral lower extremity deep

veins.

 

Electronically signed by: Olaf Longoria DO (10/20/2019 2:00 AM) [id number]

## 2019-10-20 NOTE — PDOC
PULMONARY PROGRESS NOTES


Subjective


since added humidify to 02, no hemoptysis or epistaxis, has occ cough, no sob


Vitals





Vital Signs








  Date Time  Temp Pulse Resp B/P (MAP) Pulse Ox O2 Delivery O2 Flow Rate FiO2


 


10/20/19 07:21     98 Nasal Cannula 2.0 


 


10/20/19 07:00 97.8 77 16 108/48 (68)    





 97.8       








ROS:  No Nausea


General:  Alert


HEENT:  Other (nc at perrl)


Lungs:  Crackles


Cardiovascular:  S1, S2


Abdomen:  Soft, Non-tender


Neuro Exam:  Alert


Skin:  Warm


Labs





Laboratory Tests








Test


 10/19/19


04:05 10/20/19


04:00


 


Sodium Level


 141 mmol/L


(136-145) 138 mmol/L


(136-145)


 


Potassium Level


 5.1 mmol/L


(3.5-5.1) 5.2 mmol/L


(3.5-5.1)


 


Chloride Level


 104 mmol/L


() 103 mmol/L


()


 


Carbon Dioxide Level


 33 mmol/L


(21-32) 30 mmol/L


(21-32)


 


Anion Gap 4 (6-14)  5 (6-14) 


 


Blood Urea Nitrogen


 45 mg/dL


(8-26) 36 mg/dL


(8-26)


 


Creatinine


 1.6 mg/dL


(0.7-1.3) 1.4 mg/dL


(0.7-1.3)


 


Estimated GFR


(Cockcroft-Gault) 41.2 


 48.1 





 


Glucose Level


 113 mg/dL


(70-99) 149 mg/dL


(70-99)


 


Calcium Level


 9.4 mg/dL


(8.5-10.1) 9.1 mg/dL


(8.5-10.1)








Laboratory Tests








Test


 10/20/19


04:00


 


Sodium Level


 138 mmol/L


(136-145)


 


Potassium Level


 5.2 mmol/L


(3.5-5.1)


 


Chloride Level


 103 mmol/L


()


 


Carbon Dioxide Level


 30 mmol/L


(21-32)


 


Anion Gap 5 (6-14) 


 


Blood Urea Nitrogen


 36 mg/dL


(8-26)


 


Creatinine


 1.4 mg/dL


(0.7-1.3)


 


Estimated GFR


(Cockcroft-Gault) 48.1 





 


Glucose Level


 149 mg/dL


(70-99)


 


Calcium Level


 9.1 mg/dL


(8.5-10.1)








Medications





Active Scripts








 Medications  Dose


 Route/Sig


 Max Daily Dose Days Date Category


 


 Multivitamins


  (Multivitamin) 1


 Each Tablet  1 Tab


 PO DAILY


   10/24/17 Reported


 


 Tamsulosin Hcl


 0.4 Mg Cap.er.24h  0.4 Mg


 PO DAILY


   10/24/17 Reported


 


 Simvastatin 20 Mg


 Tablet  1 Tab


 PO QHS


   10/24/17 Reported


 


 Potassium


 Chloride 20 Meq


 Tablet.er  20 Meq


 PO DAILY


   10/24/17 Reported


 


 Lasix


  (Furosemide) 20


 Mg Tablet  1 Tab


 PO DAILY


   10/24/17 Reported


 


 Carvedilol **


  (Carvedilol)


 3.125 Mg Tablet  1 Tab


 PO BID


   10/24/17 Reported


 


 Aspirin 325 Mg


 Tablet  1 Tab


 PO DAILY


   10/24/17 Reported


 


 Duoneb 0.5-3(2.5)


 Mg/3 Ml


  (Albuterol/Ipratropium)


 3 Ml Ampul.neb  3 Ml


 NEB QID


   10/24/17 Reported


 


 Proair Hfa


 Inhaler


  (Albuterol


 Sulfate) 8.5 Gm


 Hfa.aer.ad  2 Puff


 INH PRN Q6HRS PRN


   10/24/17 Reported











Impression


.


IMPRESSION:


1.  Acute respiratory failure secondary to acute diastolic congestive heart


failure and acute exacerbation of chronic obstructive pulmonary disease and


acute bronchitis.


2.  Abnormal chest x-ray.


3.  Acute diastolic congestive heart failure.


4.  Acute exacerbation of chronic obstructive pulmonary disease.


5.  Acute bronchitis.


6.  Acute kidney injury.


7.  Hyperkalemia.


8.  Pulmonary hypertension, secondary, due to chronic obstructive pulmonary


disease, congestive heart failure, moderate mitral regurgitation.





Plan


.


PLAN AND RECOMMENDATION:


1.  Titrate FiO2 to keep O2 saturation 92%.


2.  Continue bronchodilator.


3.  Continue Lasix.  Monitor potassium and K, Cl. 


4.  change Solu-Medrol to prednisone 40 mg daily


5.  Lower extremity venous Doppler, neg.


6.  ct of chest to eval abnl seen in cxr


7.  cont Rocephin.


8.  Code status needs to be addressed.  Defer to primary doctor.


9.  The patient had one episode of coughing up small amount of blood clot on 

10/18.  It


could be secondary to acute bronchitis vs dryness.  no hemoptysis or epistaxis 

since humidity added to


oxygen.





discuss w AUDELIA Leslie MD               Oct 20, 2019 08:30

## 2019-10-20 NOTE — PN
DATE:  10/20/2019



DAILY PROGRESS NOTE



LOCATION:  Room 652.



SUBJECTIVE:  The patient is awake, alert, feels like he is still a little short

of breath, but overall improved.  No further hemoptysis.



OBJECTIVE:

VITAL SIGNS:  Stable.  He is afebrile.

CHEST:  Reveals diminished breath sounds bilaterally, but no definite crackles.

HEART:  Regular.

ABDOMEN:  Benign.

EXTREMITIES:  With trace to 1+ edema.

NEUROLOGIC:  Intact.  Output is greater than intake.



LABORATORY DATA:  Creatinine is decreased to 1.4.  BUN decreased 36 this morning

despite the diuresis.  Lower extremity ultrasound for DVT was negative

bilaterally.  Pulmonary has ordered a CT of the chest to look at the

abnormalities present on the chest x-ray.



IMPRESSION:  Congestive heart failure, hypoxia, hemoptysis.



PLAN:  Ongoing diuresis.  Pulmonary and Cardiology help appreciated.  We will

continue present with additions depending on their findings and recommendations.

 



______________________________

BRIANA BERGERON MD



DR:  SRIDEVI/sloan  JOB#:  [id number] / [id number]

DD:  10/20/2019 10:18  DT:  10/20/2019 10:33

## 2019-10-21 VITALS — SYSTOLIC BLOOD PRESSURE: 108 MMHG | DIASTOLIC BLOOD PRESSURE: 64 MMHG

## 2019-10-21 VITALS — DIASTOLIC BLOOD PRESSURE: 53 MMHG | SYSTOLIC BLOOD PRESSURE: 106 MMHG

## 2019-10-21 VITALS — DIASTOLIC BLOOD PRESSURE: 43 MMHG | SYSTOLIC BLOOD PRESSURE: 113 MMHG

## 2019-10-21 VITALS — SYSTOLIC BLOOD PRESSURE: 102 MMHG | DIASTOLIC BLOOD PRESSURE: 46 MMHG

## 2019-10-21 VITALS — SYSTOLIC BLOOD PRESSURE: 108 MMHG | DIASTOLIC BLOOD PRESSURE: 51 MMHG

## 2019-10-21 VITALS — DIASTOLIC BLOOD PRESSURE: 51 MMHG | SYSTOLIC BLOOD PRESSURE: 106 MMHG

## 2019-10-21 RX ADMIN — IPRATROPIUM BROMIDE AND ALBUTEROL SULFATE SCH ML: .5; 3 SOLUTION RESPIRATORY (INHALATION) at 07:52

## 2019-10-21 RX ADMIN — Medication SCH CAP: at 08:59

## 2019-10-21 RX ADMIN — Medication SCH CAP: at 21:00

## 2019-10-21 RX ADMIN — TAMSULOSIN HYDROCHLORIDE SCH MG: 0.4 CAPSULE ORAL at 08:59

## 2019-10-21 RX ADMIN — ASPIRIN 325 MG ORAL TABLET SCH MG: 325 PILL ORAL at 09:00

## 2019-10-21 RX ADMIN — FUROSEMIDE SCH MG: 10 INJECTION, SOLUTION INTRAMUSCULAR; INTRAVENOUS at 09:01

## 2019-10-21 RX ADMIN — IPRATROPIUM BROMIDE AND ALBUTEROL SULFATE SCH ML: .5; 3 SOLUTION RESPIRATORY (INHALATION) at 18:55

## 2019-10-21 RX ADMIN — MULTIPLE VITAMINS W/ MINERALS TAB SCH TAB: TAB at 08:59

## 2019-10-21 RX ADMIN — CARVEDILOL SCH MG: 3.12 TABLET, FILM COATED ORAL at 09:01

## 2019-10-21 RX ADMIN — SIMVASTATIN SCH MG: 20 TABLET, FILM COATED ORAL at 21:00

## 2019-10-21 RX ADMIN — OXYCODONE HYDROCHLORIDE AND ACETAMINOPHEN SCH MG: 500 TABLET ORAL at 09:00

## 2019-10-21 RX ADMIN — FUROSEMIDE SCH MG: 10 INJECTION, SOLUTION INTRAMUSCULAR; INTRAVENOUS at 15:46

## 2019-10-21 RX ADMIN — IPRATROPIUM BROMIDE AND ALBUTEROL SULFATE SCH ML: .5; 3 SOLUTION RESPIRATORY (INHALATION) at 16:13

## 2019-10-21 RX ADMIN — CEFTRIAXONE SCH GM: 1 INJECTION, POWDER, FOR SOLUTION INTRAMUSCULAR; INTRAVENOUS at 10:38

## 2019-10-21 RX ADMIN — IPRATROPIUM BROMIDE AND ALBUTEROL SULFATE SCH ML: .5; 3 SOLUTION RESPIRATORY (INHALATION) at 13:00

## 2019-10-21 RX ADMIN — CARVEDILOL SCH MG: 3.12 TABLET, FILM COATED ORAL at 18:06

## 2019-10-21 NOTE — PDOC
PULMONARY PROGRESS NOTES


Subjective


NOT MORE SOA


Vitals





Vital Signs








  Date Time  Temp Pulse Resp B/P (MAP) Pulse Ox O2 Delivery O2 Flow Rate FiO2


 


10/21/19 09:01  65  108/51    


 


10/21/19 07:59     90 Nasal Cannula 2.0 


 


10/21/19 07:36 97.9  18     





 97.9       








ROS:  No Nausea, No Chest Pain, No Abdominal Pain, No Increase Cough


General:  Alert


Lungs:  Crackles


Cardiovascular:  S1, S2


Abdomen:  Soft, Non-tender


Neuro Exam:  Alert


Skin:  Warm


Labs





Laboratory Tests








Test


 10/20/19


04:00


 


Sodium Level


 138 mmol/L


(136-145)


 


Potassium Level


 5.2 mmol/L


(3.5-5.1)


 


Chloride Level


 103 mmol/L


()


 


Carbon Dioxide Level


 30 mmol/L


(21-32)


 


Anion Gap 5 (6-14) 


 


Blood Urea Nitrogen


 36 mg/dL


(8-26)


 


Creatinine


 1.4 mg/dL


(0.7-1.3)


 


Estimated GFR


(Cockcroft-Gault) 48.1 





 


Glucose Level


 149 mg/dL


(70-99)


 


Calcium Level


 9.1 mg/dL


(8.5-10.1)








Medications





Active Scripts








 Medications  Dose


 Route/Sig


 Max Daily Dose Days Date Category


 


 Multivitamins


  (Multivitamin) 1


 Each Tablet  1 Tab


 PO DAILY


   10/24/17 Reported


 


 Tamsulosin Hcl


 0.4 Mg Cap.er.24h  0.4 Mg


 PO DAILY


   10/24/17 Reported


 


 Simvastatin 20 Mg


 Tablet  1 Tab


 PO QHS


   10/24/17 Reported


 


 Potassium


 Chloride 20 Meq


 Tablet.er  20 Meq


 PO DAILY


   10/24/17 Reported


 


 Lasix


  (Furosemide) 20


 Mg Tablet  1 Tab


 PO DAILY


   10/24/17 Reported


 


 Carvedilol **


  (Carvedilol)


 3.125 Mg Tablet  1 Tab


 PO BID


   10/24/17 Reported


 


 Aspirin 325 Mg


 Tablet  1 Tab


 PO DAILY


   10/24/17 Reported


 


 Duoneb 0.5-3(2.5)


 Mg/3 Ml


  (Albuterol/Ipratropium)


 3 Ml Ampul.neb  3 Ml


 NEB QID


   10/24/17 Reported


 


 Proair Hfa


 Inhaler


  (Albuterol


 Sulfate) 8.5 Gm


 Hfa.aer.ad  2 Puff


 INH PRN Q6HRS PRN


   10/24/17 Reported











Impression


.


IMPRESSION:


1.  Acute respiratory failure secondary to acute diastolic congestive heart


failure and acute exacerbation of chronic obstructive pulmonary disease and


acute bronchitis.


2.  Abnormal chest x-ray.


3.  Acute diastolic congestive heart failure.


4.  Acute exacerbation of chronic obstructive pulmonary disease.


5.  Acute bronchitis.


6.  Acute kidney injury.


7.  Hyperkalemia.


8.  Pulmonary hypertension, secondary, due to chronic obstructive pulmonary


disease, congestive heart failure, moderate mitral regurgitation.











CT


IMPRESSION:


 


Bilateral perihilar alveolar airspace disease most favors pulmonary edema 


and/or hemorrhage. Pulmonary infiltrates may have similar appearance.


 


Cardiomegaly with small right and trace left pleural effusion and adjacent


compressive atelectasis versus infiltrates.





Plan


.


02


BD


LASIX I THNK THIS IS MOSTLY ACUTE CHF


PRED FOR NOW


WILL D/W DR APPL ON D/C PLANNING











LUIS WATKINS MD              Oct 21, 2019 09:19

## 2019-10-21 NOTE — PN
DATE:  10/21/2019



DAILY PROGRESS NOTE



SUBJECTIVE:  The patient is awake, alert, sitting in bed, still feels like he

has ongoing shortness of breath.  He is not happy with this.  No further

hemoptysis.



OBJECTIVE:

VITAL SIGNS:  Stable.  He is afebrile.

GENERAL:  Again, awake and alert.

CHEST:  Reveals occasional crackle.

HEART:  Regular.

ABDOMEN:  Benign.



LABORATORY DATA:  CT imaging done yesterday show evidence consistent with

congestive heart failure.  Intake is reported as greater than output in the last

24 hours.



IMPRESSION:  Acute diastolic congestive heart failure, clinically there is ____

improvement from admission.



PLAN:  Continue diuresis.  I will ask further thoughts from Pulmonary and

Cardiology, would expect ____.

 



______________________________

BRIANA BERGERON MD



DR:  SRIDEVI/sloan  JOB#:  [id number] / [id number]

DD:  10/21/2019 09:13  DT:  10/21/2019 09:42

## 2019-10-21 NOTE — PDOC
Provider Note


Provider Note


Pt. seen and examined. 


He is still volume overloaded significantly


Will more aggressively diurese


Supportive care. Thanks











OLU MARTINEZ MD       Oct 21, 2019 22:04

## 2019-10-22 VITALS — DIASTOLIC BLOOD PRESSURE: 49 MMHG | SYSTOLIC BLOOD PRESSURE: 102 MMHG

## 2019-10-22 VITALS — DIASTOLIC BLOOD PRESSURE: 49 MMHG | SYSTOLIC BLOOD PRESSURE: 101 MMHG

## 2019-10-22 VITALS — SYSTOLIC BLOOD PRESSURE: 105 MMHG | DIASTOLIC BLOOD PRESSURE: 34 MMHG

## 2019-10-22 VITALS — SYSTOLIC BLOOD PRESSURE: 99 MMHG | DIASTOLIC BLOOD PRESSURE: 44 MMHG

## 2019-10-22 VITALS — DIASTOLIC BLOOD PRESSURE: 49 MMHG | SYSTOLIC BLOOD PRESSURE: 108 MMHG

## 2019-10-22 LAB
ANION GAP SERPL CALC-SCNC: 11 MMOL/L (ref 6–14)
BUN SERPL-MCNC: 51 MG/DL (ref 8–26)
CALCIUM SERPL-MCNC: 8.6 MG/DL (ref 8.5–10.1)
CHLORIDE SERPL-SCNC: 100 MMOL/L (ref 98–107)
CO2 SERPL-SCNC: 30 MMOL/L (ref 21–32)
CREAT SERPL-MCNC: 1.9 MG/DL (ref 0.7–1.3)
GFR SERPLBLD BASED ON 1.73 SQ M-ARVRAT: 33.8 ML/MIN
GLUCOSE SERPL-MCNC: 148 MG/DL (ref 70–99)
MAGNESIUM SERPL-MCNC: 2.1 MG/DL (ref 1.8–2.4)
POTASSIUM SERPL-SCNC: 4.3 MMOL/L (ref 3.5–5.1)
SODIUM SERPL-SCNC: 141 MMOL/L (ref 136–145)

## 2019-10-22 RX ADMIN — Medication SCH CAP: at 08:23

## 2019-10-22 RX ADMIN — OXYCODONE HYDROCHLORIDE AND ACETAMINOPHEN SCH MG: 500 TABLET ORAL at 08:22

## 2019-10-22 RX ADMIN — IPRATROPIUM BROMIDE AND ALBUTEROL SULFATE SCH ML: .5; 3 SOLUTION RESPIRATORY (INHALATION) at 20:07

## 2019-10-22 RX ADMIN — FUROSEMIDE SCH MG: 10 INJECTION, SOLUTION INTRAMUSCULAR; INTRAVENOUS at 13:52

## 2019-10-22 RX ADMIN — FUROSEMIDE SCH MG: 10 INJECTION, SOLUTION INTRAMUSCULAR; INTRAVENOUS at 08:18

## 2019-10-22 RX ADMIN — ASPIRIN 325 MG ORAL TABLET SCH MG: 325 PILL ORAL at 08:22

## 2019-10-22 RX ADMIN — CEFTRIAXONE SCH GM: 1 INJECTION, POWDER, FOR SOLUTION INTRAMUSCULAR; INTRAVENOUS at 12:43

## 2019-10-22 RX ADMIN — TAMSULOSIN HYDROCHLORIDE SCH MG: 0.4 CAPSULE ORAL at 08:22

## 2019-10-22 RX ADMIN — SIMVASTATIN SCH MG: 20 TABLET, FILM COATED ORAL at 21:10

## 2019-10-22 RX ADMIN — Medication SCH CAP: at 21:10

## 2019-10-22 RX ADMIN — IPRATROPIUM BROMIDE AND ALBUTEROL SULFATE SCH ML: .5; 3 SOLUTION RESPIRATORY (INHALATION) at 16:05

## 2019-10-22 RX ADMIN — IPRATROPIUM BROMIDE AND ALBUTEROL SULFATE SCH ML: .5; 3 SOLUTION RESPIRATORY (INHALATION) at 11:17

## 2019-10-22 RX ADMIN — IPRATROPIUM BROMIDE AND ALBUTEROL SULFATE SCH ML: .5; 3 SOLUTION RESPIRATORY (INHALATION) at 07:43

## 2019-10-22 RX ADMIN — CARVEDILOL SCH MG: 3.12 TABLET, FILM COATED ORAL at 17:00

## 2019-10-22 RX ADMIN — POLYETHYLENE GLYCOL 3350 PRN GM: 17 POWDER, FOR SOLUTION ORAL at 08:24

## 2019-10-22 RX ADMIN — CARVEDILOL SCH MG: 3.12 TABLET, FILM COATED ORAL at 08:00

## 2019-10-22 RX ADMIN — MULTIPLE VITAMINS W/ MINERALS TAB SCH TAB: TAB at 08:22

## 2019-10-22 NOTE — PDOC
PULMONARY PROGRESS NOTES


Subjective


NOT MORE SOA


Vitals





Vital Signs








  Date Time  Temp Pulse Resp B/P (MAP) Pulse Ox O2 Delivery O2 Flow Rate FiO2


 


10/22/19 08:00      Nasal Cannula 3.0 


 


10/22/19 08:00  67  99/44    


 


10/22/19 07:43     99   


 


10/22/19 07:30 97.9  19     





 97.9       








ROS:  No Nausea, No Chest Pain, No Abdominal Pain, No Increase Cough


General:  Alert


Lungs:  Crackles


Cardiovascular:  S1, S2


Abdomen:  Soft, Non-tender


Neuro Exam:  Alert


Skin:  Warm


Medications





Active Scripts








 Medications  Dose


 Route/Sig


 Max Daily Dose Days Date Category


 


 Multivitamins


  (Multivitamin) 1


 Each Tablet  1 Tab


 PO DAILY


   10/24/17 Reported


 


 Tamsulosin Hcl


 0.4 Mg Cap.er.24h  0.4 Mg


 PO DAILY


   10/24/17 Reported


 


 Simvastatin 20 Mg


 Tablet  1 Tab


 PO QHS


   10/24/17 Reported


 


 Potassium


 Chloride 20 Meq


 Tablet.er  20 Meq


 PO DAILY


   10/24/17 Reported


 


 Lasix


  (Furosemide) 20


 Mg Tablet  1 Tab


 PO DAILY


   10/24/17 Reported


 


 Carvedilol **


  (Carvedilol)


 3.125 Mg Tablet  1 Tab


 PO BID


   10/24/17 Reported


 


 Aspirin 325 Mg


 Tablet  1 Tab


 PO DAILY


   10/24/17 Reported


 


 Duoneb 0.5-3(2.5)


 Mg/3 Ml


  (Albuterol/Ipratropium)


 3 Ml Ampul.neb  3 Ml


 NEB QID


   10/24/17 Reported


 


 Proair Hfa


 Inhaler


  (Albuterol


 Sulfate) 8.5 Gm


 Hfa.aer.ad  2 Puff


 INH PRN Q6HRS PRN


   10/24/17 Reported











Impression


.


IMPRESSION:


1.  Acute respiratory failure secondary to acute diastolic congestive heart


failure and acute exacerbation of chronic obstructive pulmonary disease and


acute bronchitis.


2.  Abnormal chest x-ray.


3.  Acute diastolic congestive heart failure.


4.  Acute exacerbation of chronic obstructive pulmonary disease.


5.  Acute bronchitis.


6.  Acute kidney injury.


7.  Hyperkalemia.


8.  Pulmonary hypertension, secondary, due to chronic obstructive pulmonary


disease, congestive heart failure, moderate mitral regurgitation.











CT


IMPRESSION:


 


Bilateral perihilar alveolar airspace disease most favors pulmonary edema 


and/or hemorrhage. Pulmonary infiltrates may have similar appearance.


 


Cardiomegaly with small right and trace left pleural effusion and adjacent


compressive atelectasis versus infiltrates.





Plan


.


D/W DR BERGERON AND CARD OK TO TRANSFER IF OK WITH CARD


WILL NEED REHAB AT [facility]


BD


MARIANELA FREGOSO THIS IS MOSTLY ACUTE CHF


PRED FOR NOW











LUIS WATKINS MD              Oct 22, 2019 09:37

## 2019-10-22 NOTE — NUR
SW following pt. Cardiology is okay with pt going to [facility]. Physician notified. SW will await 
for dc order and proceed accordingly. Discussed with RN and Nida at [facility].

## 2019-10-22 NOTE — PDOC
TOMA XAVIER APRN 10/22/19 1219:


CARDIO Progress Notes


Date and Time


Date of Service


10/22/2019


Time of Evaluation


1030





Subjective


Subjective:  No Chest Pain, No shortness of breath, No Palpitations





Vitals


Vitals





Vital Signs








  Date Time  Temp Pulse Resp B/P (MAP) Pulse Ox O2 Delivery O2 Flow Rate FiO2


 


10/22/19 11:22 97.6 80 19 102/49 (66) 97 Nasal Cannula 2.0 





 97.6       








Weight


Weight [ ]





Input and Output


Intake and Output











Intake and Output 


 


 10/22/19





 07:00


 


Intake Total 500 ml


 


Output Total 750 ml


 


Balance -250 ml


 


 


 


Intake Oral 500 ml


 


Output Urine Total 750 ml


 


# Voids 2











Physical Exam


HEENT:  Neck Supple W Full Motion


Chest:  Symmetric


LUNGS:  Other (basilar crackles)


Heart:  S1S2, RRR (SR with brief episodes of PSVTs. )


Abdomen:  Soft N/T


Extremities:  No Calf Tenderness, Other (2+ bilateral LE pitting edema)


Neurology:  alert, follow commands





Assessment


Assessment


1. Acute on chronic diastolic CHF: appears compensated


2. Hx of severe AS with past TAVR:stable


3. CAD clinically stable. 


4. Moderate to severe pulmonary HTN: pulmonary following 


5. Valvular insufficiency: Mod MR/TR


6. Possible dementia


7. YEN on CKD3





Recommendations


1. Continue lasix therapy


2. Continue secondary prevention 


3. Follow up with outpt cardiologist. 


4. Consider palliative consult to address goals of care. Maintain conservative 

measures given his age and dementia. BMP.Mg today. possible SNU





OLU MARTINEZ MD 10/22/19 1648:


CARDIO Progress Notes


Plan


Plan


Patient seen and examined. Agree with above nurse practitioner note.


Continues to have severe 2+ lower extreme edema although clinically he does not 

look significantly volume overloaded.


This may partially be related to venous insufficiency and venous stasis. 

Continue diuretics as tolerated


Consider palliative care discussion.











TOMA XAVIER          Oct 22, 2019 12:19


OLU MARTINEZ MD       Oct 22, 2019 16:48

## 2019-10-22 NOTE — NUR
wound care 

patient seen for a f/u of wound care consult. see wound assessment.  patient has a lesion on 
the left forehead the area was cleaned and redressed with recommendations of Xeroform gauze 
with a Telfa dressing change every other day, patient also has a wound on the scalp the area 
was cleaned and redressed with recommendations of Xeroform gauze with a Telfa, change every 
other day.  patient has a stage 2 pressure ulcer on the buttock/ coccyx area, 
recommendations of continuing with Calazime cream, prn.  patient currently has a P500 bed. 
patient sitting in the chair at this time with a chair cushion at this time.  wound care 
will continue to f/u for changes.

## 2019-10-22 NOTE — PDOC
GENERAL


General:


vss and afebrile. awake and alert. still sob and worse trying to lean back in c

hair. still not great urine output yesterday with bid iv lasix and will add dose

of zaroxylyn po this am. exam stable possible to snu later today depending on 

cardiology opinion this am.





VITAL SIGNS/I&O


Vital Signs/I&O:





                                   Vital Signs








  Date Time  Temp Pulse Resp B/P (MAP) Pulse Ox O2 Delivery O2 Flow Rate FiO2


 


10/22/19 07:43     99 Nasal Cannula 3.0 


 


10/22/19 03:30 98.8 72 20 101/49 (66)    





 98.8       














                                    I & O   


 


 10/21/19 10/21/19 10/22/19





 15:00 23:00 07:00


 


Intake Total 100 ml 400 ml 


 


Output Total  300 ml 450 ml


 


Balance 100 ml 100 ml -450 ml











ALLERGIES


Allergies:





Allergies








Coded Allergies Type Severity Reaction Last Updated Verified


 


  No Known Drug Allergies    10/24/17 No











MEDS


Medications:





Current Medications








 Medications


  (Trade)  Dose


 Ordered  Sig/Ray


 Route


 PRN Reason  Start Time


 Stop Time Status Last Admin


Dose Admin


 


 Furosemide


  (Lasix)  40 mg  BID92


 IVP


   10/21/19 15:00


    10/21/19 15:46




















BRIANA BERGERON MD              Oct 22, 2019 07:51

## 2019-10-23 VITALS — SYSTOLIC BLOOD PRESSURE: 106 MMHG | DIASTOLIC BLOOD PRESSURE: 37 MMHG

## 2019-10-23 VITALS — SYSTOLIC BLOOD PRESSURE: 108 MMHG | DIASTOLIC BLOOD PRESSURE: 44 MMHG

## 2019-10-23 VITALS — DIASTOLIC BLOOD PRESSURE: 35 MMHG | SYSTOLIC BLOOD PRESSURE: 100 MMHG

## 2019-10-23 VITALS — DIASTOLIC BLOOD PRESSURE: 48 MMHG | SYSTOLIC BLOOD PRESSURE: 112 MMHG

## 2019-10-23 RX ADMIN — CARVEDILOL SCH MG: 3.12 TABLET, FILM COATED ORAL at 09:11

## 2019-10-23 RX ADMIN — Medication SCH CAP: at 09:10

## 2019-10-23 RX ADMIN — OXYCODONE HYDROCHLORIDE AND ACETAMINOPHEN SCH MG: 500 TABLET ORAL at 09:10

## 2019-10-23 RX ADMIN — CEFTRIAXONE SCH GM: 1 INJECTION, POWDER, FOR SOLUTION INTRAMUSCULAR; INTRAVENOUS at 11:11

## 2019-10-23 RX ADMIN — TAMSULOSIN HYDROCHLORIDE SCH MG: 0.4 CAPSULE ORAL at 09:14

## 2019-10-23 RX ADMIN — POLYETHYLENE GLYCOL 3350 PRN GM: 17 POWDER, FOR SOLUTION ORAL at 09:14

## 2019-10-23 RX ADMIN — ASPIRIN 325 MG ORAL TABLET SCH MG: 325 PILL ORAL at 09:11

## 2019-10-23 RX ADMIN — IPRATROPIUM BROMIDE AND ALBUTEROL SULFATE SCH ML: .5; 3 SOLUTION RESPIRATORY (INHALATION) at 07:06

## 2019-10-23 RX ADMIN — IPRATROPIUM BROMIDE AND ALBUTEROL SULFATE SCH ML: .5; 3 SOLUTION RESPIRATORY (INHALATION) at 10:42

## 2019-10-23 RX ADMIN — MULTIPLE VITAMINS W/ MINERALS TAB SCH TAB: TAB at 09:11

## 2019-10-23 RX ADMIN — FUROSEMIDE SCH MG: 10 INJECTION, SOLUTION INTRAMUSCULAR; INTRAVENOUS at 09:09

## 2019-10-23 NOTE — NUR
LORI following pt. Pt has discharged orders to go SNF but med rec does not match with dc 
instruction. Physician is notified about this. Pt is not able to discharge until med rec is 
reconciled accurately. Discussed with RN and Nida mccollum PP.

## 2019-10-23 NOTE — PDOC
PULMONARY PROGRESS NOTES


Subjective


NOT MORE SOA


Vitals





Vital Signs








  Date Time  Temp Pulse Resp B/P (MAP) Pulse Ox O2 Delivery O2 Flow Rate FiO2


 


10/23/19 10:53 97.7 75 16 108/44 (65) 98 Nasal Cannula 2.0 





 97.7       








ROS:  No Nausea, No Chest Pain, No Abdominal Pain, No Increase Cough


General:  Alert


Lungs:  Crackles


Cardiovascular:  S1, S2


Abdomen:  Soft, Non-tender


Neuro Exam:  Alert


Skin:  Warm


Labs





Laboratory Tests








Test


 10/22/19


14:35


 


Sodium Level


 141 mmol/L


(136-145)


 


Potassium Level


 4.3 mmol/L


(3.5-5.1)


 


Chloride Level


 100 mmol/L


()


 


Carbon Dioxide Level


 30 mmol/L


(21-32)


 


Anion Gap 11 (6-14) 


 


Blood Urea Nitrogen


 51 mg/dL


(8-26)


 


Creatinine


 1.9 mg/dL


(0.7-1.3)


 


Estimated GFR


(Cockcroft-Gault) 33.8 





 


Glucose Level


 148 mg/dL


(70-99)


 


Calcium Level


 8.6 mg/dL


(8.5-10.1)


 


Magnesium Level


 2.1 mg/dL


(1.8-2.4)








Laboratory Tests








Test


 10/22/19


14:35


 


Sodium Level


 141 mmol/L


(136-145)


 


Potassium Level


 4.3 mmol/L


(3.5-5.1)


 


Chloride Level


 100 mmol/L


()


 


Carbon Dioxide Level


 30 mmol/L


(21-32)


 


Anion Gap 11 (6-14) 


 


Blood Urea Nitrogen


 51 mg/dL


(8-26)


 


Creatinine


 1.9 mg/dL


(0.7-1.3)


 


Estimated GFR


(Cockcroft-Gault) 33.8 





 


Glucose Level


 148 mg/dL


(70-99)


 


Calcium Level


 8.6 mg/dL


(8.5-10.1)


 


Magnesium Level


 2.1 mg/dL


(1.8-2.4)








Medications





Active Scripts








 Medications  Dose


 Route/Sig


 Max Daily Dose Days Date Category


 


 Multivitamins


  (Multivitamin) 1


 Each Tablet  1 Tab


 PO DAILY


   10/24/17 Reported


 


 Tamsulosin Hcl


 0.4 Mg Cap.er.24h  0.4 Mg


 PO DAILY


   10/24/17 Reported


 


 Simvastatin 20 Mg


 Tablet  1 Tab


 PO QHS


   10/24/17 Reported


 


 Potassium


 Chloride 20 Meq


 Tablet.er  20 Meq


 PO DAILY


   10/24/17 Reported


 


 Lasix


  (Furosemide) 20


 Mg Tablet  1 Tab


 PO DAILY


   10/24/17 Reported


 


 Carvedilol **


  (Carvedilol)


 3.125 Mg Tablet  1 Tab


 PO BID


   10/24/17 Reported


 


 Aspirin 325 Mg


 Tablet  1 Tab


 PO DAILY


   10/24/17 Reported


 


 Duoneb 0.5-3(2.5)


 Mg/3 Ml


  (Albuterol/Ipratropium)


 3 Ml Ampul.neb  3 Ml


 NEB QID


   10/24/17 Reported


 


 Proair Hfa


 Inhaler


  (Albuterol


 Sulfate) 8.5 Gm


 Hfa.aer.ad  2 Puff


 INH PRN Q6HRS PRN


   10/24/17 Reported











Impression


.


IMPRESSION:


1.  Acute respiratory failure secondary to acute diastolic congestive heart


failure and acute exacerbation of chronic obstructive pulmonary disease and


acute bronchitis.


2.  Abnormal chest x-ray.


3.  Acute diastolic congestive heart failure.


4.  Acute exacerbation of chronic obstructive pulmonary disease.


5.  Acute bronchitis.


6.  Acute kidney injury.


7.  Hyperkalemia.


8.  Pulmonary hypertension, secondary, due to chronic obstructive pulmonary


disease, congestive heart failure, moderate mitral regurgitation.











CT


IMPRESSION:


 


Bilateral perihilar alveolar airspace disease most favors pulmonary edema 


and/or hemorrhage. Pulmonary infiltrates may have similar appearance.


 


Cardiomegaly with small right and trace left pleural effusion and adjacent


compressive atelectasis versus infiltrates.





Plan


.


D/W LUIS BEAR MD              Oct 23, 2019 10:58

## 2019-10-23 NOTE — PDOC
CARDIO Progress Notes


Date and Time


Date of Service


10/23/19


Time of Evaluation


1315





Subjective


Subjective:  No Chest Pain, No shortness of breath, No Palpitations, Other (no 

specific complaints )





Vitals


Vitals





Vital Signs








  Date Time  Temp Pulse Resp B/P (MAP) Pulse Ox O2 Delivery O2 Flow Rate FiO2


 


10/23/19 10:53 97.7 75 16 108/44 (65) 98 Nasal Cannula 2.0 





 97.7       








Weight


Weight [ ]





Input and Output


Intake and Output











Intake and Output 


 


 10/23/19





 07:00


 


Intake Total 230 ml


 


Output Total 600 ml


 


Balance -370 ml


 


 


 


Intake Oral 230 ml


 


Output Urine Total 600 ml


 


# Voids 11











Laboratory


Labs





Laboratory Tests








Test


 10/22/19


14:35


 


Sodium Level


 141 mmol/L


(136-145)


 


Potassium Level


 4.3 mmol/L


(3.5-5.1)


 


Chloride Level


 100 mmol/L


()


 


Carbon Dioxide Level


 30 mmol/L


(21-32)


 


Anion Gap 11 (6-14) 


 


Blood Urea Nitrogen


 51 mg/dL


(8-26)


 


Creatinine


 1.9 mg/dL


(0.7-1.3)


 


Estimated GFR


(Cockcroft-Gault) 33.8 





 


Glucose Level


 148 mg/dL


(70-99)


 


Calcium Level


 8.6 mg/dL


(8.5-10.1)


 


Magnesium Level


 2.1 mg/dL


(1.8-2.4)











Physical Exam


HEENT:  Neck Supple W Full Motion


Chest:  Symmetric


LUNGS:  Other (fine bibasilar crackles, diminished )


Heart:  S1S2, RRR (SR with brief episodes of PSVTs. )


Abdomen:  Soft N/T


Extremities:  No Calf Tenderness, Other (1-2+ bilateral LE pitting edema, 

erythma )


Neurology:  alert, follow commands, other ([place]')





Assessment


Assessment


1. Acute on chronic diastolic CHF: appears compensated


2. YEN on CKD3; Cr up to 1.9


3. LE edema; probable venous stasis component 


3. CAD; clinically stable. 


4. Hx of severe AS with past TAVR


5. Valvular insufficiency: Mod MR/TR


6. Moderate to severe pulmonary HTN


7. Possible dementia





Recommendations





Convert lasix to oral 


Continue metolazone 


LE elevation


Secondary prevention measures  


May discharge to SNU today


F/u with primary cardiologist at [facility]           Oct 23, 2019 13:52

## 2019-10-23 NOTE — SNU/HH DC
DISCHARGE ORDERS


DISCHARGE INFORMATION:


DISCHARGE DATE:  Oct 23, 2019


FINAL DIAGNOSIS


Problems


Medical Problems:


(1) CHF exacerbation


Status: Acute  





(2) Dyspnea


Status: Acute  





(3) Elevated bilirubin


Status: Acute  





(4) Hypoxia


Status: Acute  





(5) Renal insufficiency


Status: Acute  








CONDITION ON DISCHARGE:  Stable





CODE STATUS:


Code Status:  Full





SKILLED NURSING:


SNF STAY <30 DAYS:  Yes





HOSPICE:


HOSPICE:  No


HOSPICE EVAL & TREAT:  No





LTAC:


ADMIT TO LTAC:  No





POST DISCHARGE ORDERS:


ACTIVITY ORDERS:  Activity as tolerated, Progressive ambulation


WEIGHT BEARING STATUS:  As tolerated


DIET AFTER DISCHARGE:  Cardiac





CHECKS AFTER DISCHARGE:


CHECKS AFTER DISCHARGE:  Check blood press - daily





TREATMENT/EQUIPMENT ORDERS:


Physical Therapy For:  Evalulation/Treatment


Occupational Therapy For:  Evaluation/Treatment





DISCHARGE MEDICATIONS:


Home Meds


Reported Medications


Multivitamin (MULTIVITAMINS) 1 Each Tablet, 1 TAB PO DAILY, #90 TAB 3 Refills


   10/24/17


Tamsulosin Hcl (TAMSULOSIN HCL) 0.4 Mg Cap.er.24h, 0.4 MG PO DAILY, TAB


   10/24/17


Simvastatin (SIMVASTATIN) 20 Mg Tablet, 1 TAB PO QHS, #30 TAB 5 Refills


   10/24/17


Potassium Chloride (POTASSIUM CHLORIDE) 20 Meq Tablet.er, 20 MEQ PO DAILY, 

TAB.SR


   10/24/17


Furosemide (LASIX) 20 Mg Tablet, 1 TAB PO DAILY, #90 TAB 1 Refill


   10/24/17


Carvedilol (CARVEDILOL **) 3.125 Mg Tablet, 1 TAB PO BID, #60 TAB 3 Refills


   10/24/17


Aspirin (ASPIRIN) 325 Mg Tablet, 1 TAB PO DAILY, #30 TAB 5 Refills


   10/24/17


Ipratropium/Albuterol Sulfate (DUONEB 0.5-3(2.5) MG/3 ML) 3 Ml Ampul.neb, 3 ML 

NEB QID, EACH


   10/24/17


Albuterol Sulfate (PROAIR HFA INHALER) 8.5 Gm Hfa.aer.ad, 2 PUFF INH PRN Q6HRS 

PRN for SHORTNESS OF BREATH, INHALER 0 Refills


   10/24/17











BRIANA BERGERON MD              Oct 23, 2019 07:51

## 2019-10-23 NOTE — DS
DATE OF DISCHARGE:  10/23/2019



PRIMARY DIAGNOSES:  Exacerbation of congestive heart failure with acute

diastolic heart failure with shortness of breath.



ADDITIONAL DIAGNOSES:  Chronic kidney disease, history of cerebrovascular

accident, hypertension, and hemoptysis.



CHIEF COMPLAINT AND HISTORY OF PRESENT ILLNESS:  This 86-year-old white male

admitted with 10 days or so of increasing shortness of breath through the

Emergency Room with findings consistent with bilateral perihilar infiltrates

consistent with heart failure and elevated BNP and a creatinine of 1.7.



SUMMARY OF STAY:  The patient was admitted.  Pulmonary and Cardiology saw

throughout the stay, was treated with antibiotics for an exacerbation of COPD as

well as steroids along with diuresis.  He did improve somewhat, but still was

far from not short of breath and very weak, felt he needed to go to skilled

nursing for a while and this was accomplished on the day of dismissal.



DISPOSITION:  The patient is discharged to skilled nursing.  Please see orders

regarding diet, medication, activity, etc.  We will continue to follow him

there.

 



______________________________

BRIANA BERGERON MD



DR:  [email or web address]  JOB#:  [id number] / [id number]

DD:  10/23/2019 07:57  DT:  10/23/2019 08:08

## 2019-10-23 NOTE — NUR
Patient discharged to skilled nursing facility. Report called to Amrita at [facility]. 
Discharge papers given to . All belongings with patient. Security drove 
patients personal belongings (credit cards and cash) to [facility] and given to nurse. 
Patient assisted out in wheelchair with transportation at this time

## 2019-10-23 NOTE — SNU/HH DC
DISCHARGE ORDERS


DISCHARGE INFORMATION:


DISCHARGE DATE:  Oct 23, 2019


FINAL DIAGNOSIS


Problems


Medical Problems:


(1) CHF exacerbation


Status: Acute  





(2) Dyspnea


Status: Acute  





(3) Elevated bilirubin


Status: Acute  





(4) Hypoxia


Status: Acute  





(5) Renal insufficiency


Status: Acute  








CONDITION ON DISCHARGE:  Stable





CODE STATUS:


Code Status:  Full





SKILLED NURSING:


SNF STAY <30 DAYS:  Yes





HOSPICE:


HOSPICE:  No


HOSPICE EVAL & TREAT:  No





LTAC:


ADMIT TO LTAC:  No





POST DISCHARGE ORDERS:


ACTIVITY ORDERS:  Activity as tolerated, Avoid high altitudes, Progressive 

ambulation


WEIGHT BEARING STATUS:  As tolerated


DIET AFTER DISCHARGE:  Cardiac





CHECKS AFTER DISCHARGE:


CHECKS AFTER DISCHARGE:  Check blood press - daily





TREATMENT/EQUIPMENT ORDERS:


RESPIRATORY EQUIPMENT NEEDED:  Oxygen


Physical Therapy For:  Evalulation/Treatment


Occupational Therapy For:  Evaluation/Treatment





DISCHARGE MEDICATIONS:


Home Meds


Active Scripts


Furosemide (LASIX) 40 Mg Tablet, 40 MG PO BID for chf for 30 Days, #60 TAB


   Prov:BRIANA BERGERON MD         10/23/19


Prednisone (PREDNISONE) 20 Mg Tablet, 40 MG PO DAILY for sob for 30 Days, #60 

TAB


   Prov:BRIANA BERGERON MD         10/23/19


Polyethylene Glycol 3350 (POLYETHYLENE GLYCOL 3350) 17 Gm Powd.pack, 17 GM PO 

PRN BID PRN for CONSTIPATION for 30 Days, #30 PKT


   Prov:BRIANA BERGERON MD         10/23/19


Reported Medications


Multivitamin (MULTIVITAMINS) 1 Each Tablet, 1 TAB PO DAILY, #90 TAB 3 Refills


   10/24/17


Tamsulosin Hcl (TAMSULOSIN HCL) 0.4 Mg Cap.er.24h, 0.4 MG PO DAILY, TAB


   10/24/17


Simvastatin (SIMVASTATIN) 20 Mg Tablet, 1 TAB PO QHS, #30 TAB 5 Refills


   10/24/17


Potassium Chloride (POTASSIUM CHLORIDE) 20 Meq Tablet.er, 20 MEQ PO DAILY, 

TAB.SR


   10/24/17


Carvedilol (CARVEDILOL **) 3.125 Mg Tablet, 1 TAB PO BID, #60 TAB 3 Refills


   10/24/17


Aspirin (ASPIRIN) 325 Mg Tablet, 1 TAB PO DAILY, #30 TAB 5 Refills


   10/24/17


Ipratropium/Albuterol Sulfate (DUONEB 0.5-3(2.5) MG/3 ML) 3 Ml Ampul.neb, 3 ML 

NEB QID, EACH


   10/24/17


Albuterol Sulfate (PROAIR HFA INHALER) 8.5 Gm Hfa.aer.ad, 2 PUFF INH PRN Q6HRS 

PRN for SHORTNESS OF BREATH, INHALER 0 Refills


   10/24/17


Discontinued Reported Medications


Furosemide (LASIX) 20 Mg Tablet, 1 TAB PO DAILY, #90 TAB 1 Refill


   10/24/17











BRIANA BERGERON MD              Oct 23, 2019 15:00